# Patient Record
Sex: FEMALE | URBAN - METROPOLITAN AREA
[De-identification: names, ages, dates, MRNs, and addresses within clinical notes are randomized per-mention and may not be internally consistent; named-entity substitution may affect disease eponyms.]

---

## 2021-01-27 DIAGNOSIS — Z23 ENCOUNTER FOR IMMUNIZATION: ICD-10-CM

## 2021-02-12 ENCOUNTER — IMMUNIZATIONS (OUTPATIENT)
Dept: FAMILY MEDICINE CLINIC | Facility: HOSPITAL | Age: 87
End: 2021-02-12

## 2021-02-12 DIAGNOSIS — Z23 ENCOUNTER FOR IMMUNIZATION: Primary | ICD-10-CM

## 2021-02-12 PROCEDURE — 91300 SARS-COV-2 / COVID-19 MRNA VACCINE (PFIZER-BIONTECH) 30 MCG: CPT

## 2021-02-12 PROCEDURE — 0001A SARS-COV-2 / COVID-19 MRNA VACCINE (PFIZER-BIONTECH) 30 MCG: CPT

## 2021-03-05 ENCOUNTER — IMMUNIZATIONS (OUTPATIENT)
Dept: FAMILY MEDICINE CLINIC | Facility: HOSPITAL | Age: 87
End: 2021-03-05

## 2021-03-05 DIAGNOSIS — Z23 ENCOUNTER FOR IMMUNIZATION: Primary | ICD-10-CM

## 2021-03-05 PROCEDURE — 0002A SARS-COV-2 / COVID-19 MRNA VACCINE (PFIZER-BIONTECH) 30 MCG: CPT

## 2021-03-05 PROCEDURE — 91300 SARS-COV-2 / COVID-19 MRNA VACCINE (PFIZER-BIONTECH) 30 MCG: CPT

## 2022-12-14 ENCOUNTER — APPOINTMENT (OUTPATIENT)
Dept: PREADMISSION TESTING | Facility: HOSPITAL | Age: 87
End: 2022-12-14
Attending: ORTHOPAEDIC SURGERY
Payer: MEDICARE

## 2022-12-14 ENCOUNTER — TRANSCRIBE ORDERS (OUTPATIENT)
Dept: PREADMISSION TESTING | Facility: HOSPITAL | Age: 87
End: 2022-12-14

## 2022-12-14 ENCOUNTER — APPOINTMENT (OUTPATIENT)
Dept: LAB | Facility: HOSPITAL | Age: 87
End: 2022-12-14
Attending: ORTHOPAEDIC SURGERY
Payer: MEDICARE

## 2022-12-14 VITALS
DIASTOLIC BLOOD PRESSURE: 72 MMHG | WEIGHT: 126 LBS | SYSTOLIC BLOOD PRESSURE: 130 MMHG | RESPIRATION RATE: 16 BRPM | TEMPERATURE: 98.3 F | HEIGHT: 61 IN | OXYGEN SATURATION: 99 % | HEART RATE: 60 BPM | BODY MASS INDEX: 23.79 KG/M2

## 2022-12-14 DIAGNOSIS — E78.5 HYPERLIPIDEMIA, UNSPECIFIED HYPERLIPIDEMIA TYPE: ICD-10-CM

## 2022-12-14 DIAGNOSIS — Z01.818 ENCOUNTER FOR OTHER PREPROCEDURAL EXAMINATION: ICD-10-CM

## 2022-12-14 DIAGNOSIS — M17.11 UNILATERAL PRIMARY OSTEOARTHRITIS, RIGHT KNEE: ICD-10-CM

## 2022-12-14 DIAGNOSIS — M17.11 UNILATERAL PRIMARY OSTEOARTHRITIS, RIGHT KNEE: Primary | ICD-10-CM

## 2022-12-14 DIAGNOSIS — K21.9 GASTROESOPHAGEAL REFLUX DISEASE, UNSPECIFIED WHETHER ESOPHAGITIS PRESENT: ICD-10-CM

## 2022-12-14 DIAGNOSIS — Z01.818 ENCOUNTER FOR PRE-OPERATIVE EXAMINATION: Primary | ICD-10-CM

## 2022-12-14 DIAGNOSIS — I10 PRIMARY HYPERTENSION: ICD-10-CM

## 2022-12-14 DIAGNOSIS — R94.31 ABNORMAL EKG: ICD-10-CM

## 2022-12-14 LAB
ABO + RH BLD: NORMAL
ALBUMIN SERPL-MCNC: 4.1 G/DL (ref 3.4–5)
ALP SERPL-CCNC: 79 IU/L (ref 35–126)
ALT SERPL-CCNC: 15 IU/L (ref 11–54)
ANION GAP SERPL CALC-SCNC: 6 MEQ/L (ref 3–15)
AST SERPL-CCNC: 21 IU/L (ref 15–41)
BASOPHILS # BLD: 0.05 K/UL (ref 0.01–0.1)
BASOPHILS NFR BLD: 1.2 %
BILIRUB SERPL-MCNC: 1 MG/DL (ref 0.3–1.2)
BLD GP AB SCN SERPL QL: NEGATIVE
BLOOD BANK CMNT PATIENT-IMP: NORMAL
BUN SERPL-MCNC: 20 MG/DL (ref 8–20)
CALCIUM SERPL-MCNC: 9.6 MG/DL (ref 8.9–10.3)
CHLORIDE SERPL-SCNC: 102 MEQ/L (ref 98–109)
CO2 SERPL-SCNC: 27 MEQ/L (ref 22–32)
CREAT SERPL-MCNC: 0.8 MG/DL (ref 0.6–1.1)
D AG BLD QL: POSITIVE
DIFFERENTIAL METHOD BLD: ABNORMAL
EOSINOPHIL # BLD: 0.31 K/UL (ref 0.04–0.36)
EOSINOPHIL NFR BLD: 7.3 %
ERYTHROCYTE [DISTWIDTH] IN BLOOD BY AUTOMATED COUNT: 13.6 % (ref 11.7–14.4)
GFR SERPL CREATININE-BSD FRML MDRD: >60 ML/MIN/1.73M*2
GLUCOSE SERPL-MCNC: 91 MG/DL (ref 70–99)
HCT VFR BLDCO AUTO: 37.6 % (ref 35–45)
HGB BLD-MCNC: 12.3 G/DL (ref 11.8–15.7)
IMM GRANULOCYTES # BLD AUTO: 0.01 K/UL (ref 0–0.08)
IMM GRANULOCYTES NFR BLD AUTO: 0.2 %
LABORATORY COMMENT REPORT: NORMAL
LYMPHOCYTES # BLD: 1.69 K/UL (ref 1.2–3.5)
LYMPHOCYTES NFR BLD: 39.8 %
MCH RBC QN AUTO: 31.5 PG (ref 28–33.2)
MCHC RBC AUTO-ENTMCNC: 32.7 G/DL (ref 32.2–35.5)
MCV RBC AUTO: 96.2 FL (ref 83–98)
MONOCYTES # BLD: 0.45 K/UL (ref 0.28–0.8)
MONOCYTES NFR BLD: 10.6 %
NEUTROPHILS # BLD: 1.74 K/UL (ref 1.7–7)
NEUTS SEG NFR BLD: 40.9 %
NRBC BLD-RTO: 0 %
PDW BLD AUTO: 9.6 FL (ref 9.4–12.3)
PLATELET # BLD AUTO: 375 K/UL (ref 150–369)
POTASSIUM SERPL-SCNC: 4.2 MEQ/L (ref 3.6–5.1)
PROT SERPL-MCNC: 6.9 G/DL (ref 6–8.2)
RBC # BLD AUTO: 3.91 M/UL (ref 3.93–5.22)
SODIUM SERPL-SCNC: 135 MEQ/L (ref 136–144)
SPECIMEN EXP DATE BLD: NORMAL
WBC # BLD AUTO: 4.25 K/UL (ref 3.8–10.5)

## 2022-12-14 PROCEDURE — 80053 COMPREHEN METABOLIC PANEL: CPT

## 2022-12-14 PROCEDURE — 86850 RBC ANTIBODY SCREEN: CPT

## 2022-12-14 PROCEDURE — 99203 OFFICE O/P NEW LOW 30 MIN: CPT | Performed by: HOSPITALIST

## 2022-12-14 PROCEDURE — 85025 COMPLETE CBC W/AUTO DIFF WBC: CPT

## 2022-12-14 PROCEDURE — 36415 COLL VENOUS BLD VENIPUNCTURE: CPT

## 2022-12-14 RX ORDER — AMLODIPINE BESYLATE 5 MG/1
5 TABLET ORAL DAILY
COMMUNITY

## 2022-12-14 RX ORDER — LANSOPRAZOLE 30 MG/1
30 CAPSULE, DELAYED RELEASE ORAL
COMMUNITY

## 2022-12-14 RX ORDER — CHOLECALCIFEROL (VITAMIN D3) 25 MCG
1000 TABLET ORAL DAILY
COMMUNITY

## 2022-12-14 RX ORDER — UBIDECARENONE 100 MG
100 CAPSULE ORAL DAILY
Status: ON HOLD | COMMUNITY
End: 2022-12-28 | Stop reason: SDUPTHER

## 2022-12-14 RX ORDER — PRAVASTATIN SODIUM 20 MG/1
20 TABLET ORAL DAILY
COMMUNITY

## 2022-12-14 RX ORDER — HYDROCHLOROTHIAZIDE 12.5 MG/1
12.5 CAPSULE ORAL DAILY
COMMUNITY

## 2022-12-14 RX ORDER — IBUPROFEN 600 MG/1
600 TABLET ORAL EVERY 6 HOURS PRN
COMMUNITY

## 2022-12-14 ASSESSMENT — PAIN SCALES - GENERAL: PAINLEVEL: 0-NO PAIN

## 2022-12-14 NOTE — H&P (VIEW-ONLY)
[facility] Medicine Service -  Pre-Operative Consultation       Patient Name: Felicia Puckett  Referring Surgeon: Dr Vee    Reason for Referral: Pre-Operative Evaluation  Surgical Procedure: Right Knee Arthroplasty  Operative Date: 12/27/22  Other Providers:      PCP: Leela Dewey MD          HISTORY OF PRESENT ILLNESS      Felicia Puckett is a 89 y.o. female presenting today to the [facility] at [place] for pre-operative evaluation prior to planned surgery.    Complaining of right knee pain for the last 20 years  and worse for the last one year  She tried all nonoperative measures without much improvement  She denies any exertional chest pain or sob and her exercise tolerance is > 4 mets. She is cleared by her cardiologist Myriam Ludwig, for the upcoming procedure  She denies any history of cad/mi/stroke      In regards to medical history:  Hypertension  Hyperlipidemia  Gerd    The patient denies any current or recent chest pain or pressure, dyspnea, cough, sputum, fevers, chills, abdominal pain, nausea, vomiting, diarrhea or other symptoms.     Functionally, the patient is able to ascend a flight or so of stairs with no dyspnea or chest pain.     The patient denies, on specific questioning, the following:  No history of MI, arrhythmia,or CHF.  No history of ALEA.  No history of DVT/PE.  No history of COPD.  No history of CVA.  No history of DM.   No history of CKD.     PAST MEDICAL AND SURGICAL HISTORY      Past Medical History:   Diagnosis Date   • COVID 11/18/2022    not hospitalized, dirrhea for 1 week and fever.   • COVID-19 vaccine series completed    • GERD (gastroesophageal reflux disease)    • Hypertension    • Lipid disorder        Past Surgical History:   Procedure Laterality Date   • BLADDER SURGERY     • FINGER SURGERY     • HYSTERECTOMY         MEDICATIONS        Current Outpatient Medications:   •  amLODIPine (NORVASC) 5 mg  "tablet, Take 5 mg by mouth daily. am, Disp: , Rfl:   •  cholecalciferol, vitamin D3, 1,000 unit (25 mcg) tablet, Take 1,000 Units by mouth daily., Disp: , Rfl:   •  coenzyme Q10 (COQ10) 100 mg capsule, Take 100 mg by mouth daily., Disp: , Rfl:   •  esterified estrogens (MENEST) 0.3 mg tablet, Take 0.3 mg by mouth daily., Disp: , Rfl:   •  hydrochlorothiazide (MICROZIDE) 12.5 mg capsule, Take 12.5 mg by mouth daily. am, Disp: , Rfl:   •  ibuprofen (MOTRIN) 600 mg tablet, Take 600 mg by mouth every 6 (six) hours as needed. Pt usually takes 1 time a day., Disp: , Rfl:   •  lansoprazole (PREVACID) 30 mg capsule, Take 30 mg by mouth daily before breakfast. am, Disp: , Rfl:   •  pravastatin (PRAVACHOL) 20 mg tablet, Take 20 mg by mouth daily. pm, Disp: , Rfl:     ALLERGIES      Penicillins    FAMILY HISTORY      family history includes Hypertension in her biological mother.    Denies any prior known family history of DVTs/PEs/clotting disorder    SOCIAL HISTORY      Social History     Tobacco Use   • Smoking status: Never   • Smokeless tobacco: Never   Vaping Use   • Vaping Use: Never used   Substance Use Topics   • Alcohol use: Not Currently   • Drug use: Never       REVIEW OF SYSTEMS      All other systems reviewed and negative except as noted in HPI    PHYSICAL EXAMINATION      Visit Vitals  /72 (BP Location: Left upper arm, Patient Position: Sitting)   Pulse 60   Temp 36.8 °C (98.3 °F)   Resp 16   Ht 1.549 m (5' 1\")   Wt 57.2 kg (126 lb)   SpO2 99%   BMI 23.81 kg/m²     Body mass index is 23.81 kg/m².    Physical Exam  General appearance: alert, appears stated age, cooperative, non-toxic  Head: normocephalic, without obvious abnormality, atraumatic  Eyes: conjunctivae clear. PERRL, EOMI's intact.  Lungs: clear to auscultation bilaterally   Heart: regular rate and rhythm, S1, S2 normal,   Abdomen: Nondistended, +BS, soft, non-tender, no masses palpable   Extremities: Right knee range of movements limited " secondary to pain  Pulses: 2+ and symmetric B/L DP  Neurologic: Alert and oriented X 3, no focal deficits  Skin: intact, no rashes or lesions      LABS / EKG        Labs      Lab Results   Component Value Date     (L) 12/14/2022    K 4.2 12/14/2022     12/14/2022    BUN 20 12/14/2022    CREATININE 0.8 12/14/2022    WBC 4.25 12/14/2022    HGB 12.3 12/14/2022    HCT 37.6 12/14/2022     (H) 12/14/2022    ALT 15 12/14/2022    AST 21 12/14/2022         ECG/Telemetry  I have independently reviewed the ECG. Significant findings include EKG shows normal sinus rhythm with poor R wave progression with septal Q waves.    ASSESSMENT AND PLAN         Encounter for pre-operative examination  Scheduled to have Right Total Knee Arthroplasty by Dr Vee on 12/27/22  Complaining of right knee pain for the last 20 years  and worse for the last one year  She tried all nonoperative measures without much improvement  She denies any exertional chest pain or sob and her exercise tolerance is > 4 mets. She is cleared by her cardiologist Dr Salinas, Myriam Roth, for the upcoming procedure  She denies any history of cad/mi/stroke  She denies any bowel or bladder disturbance  She denies any history of dvt/pe  She snores at night time but denies any apneic episodes  EKG shows normal sinus rhythm with poor R wave progression with questionable septal Q waves    Abnormal EKG  EKG shows normal sinus rhythm with poor R wave progression with septal Q waves  Patient was evaluated by cardiology and had a negative stress test and normal echo and cleared for surgery    Primary hypertension  Continue amlodipine but hold HCTZ on the a.m. of surgery    Hyperlipidemia  Continue pravastatin    GERD (gastroesophageal reflux disease)  Continue PPI       In regards to perioperative cardiac risk:  Regarding perioperative cardiovascular risk:  The patient has the following risk factors: none.  This correlates to a rCRI score of 0  with perioperative cardiac event risk of 0.4%.  Knee arthroplasty is an intermediate risk procedure and she is at acceptable risk for surgery      Further comments:  Resume supplements when OK with surgical team.  I would encourage incentive spirometry to assist with minimizing silvia-operative pulmonary risk.  DVT prophylaxis and timing of such per the discretion of the surgeon.     Please do not hesitate to contact [facility] during the upcoming hospitalization with any questions or concerns.     Timi Perkins MD  12/14/2022

## 2022-12-14 NOTE — ASSESSMENT & PLAN NOTE
EKG shows normal sinus rhythm with poor R wave progression with septal Q waves  Patient was evaluated by cardiology and had a negative stress test and normal echo and cleared for surgery

## 2022-12-14 NOTE — PRE-PROCEDURE INSTRUCTIONS
1. Admissions will call you with your arrival time on  1/2/23/22 (day prior to surgery) between 2pm-4pm.  For questions about your arrival time, please call [phone].     2. On the day of your procedure please report to the [facility] in the [place]. Please arrive through the [place] Lobby Entrance.  If you are parking in the [facility], come to the ground floor of the garage and follow signs to the [facility].  After being screened, please report to either the Outpatient Registration for Pre-Admission Testing or to the Surgery Registration Desk.    3. Please follow the following fasting guidelines:     No solid food EIGHT HOURS prior to surgery.  Unlimited clear liquids, meaning water or PLAIN black coffee WITHOUT any milk, cream, sugar, or sweetener are permitted up to TWO HOURS prior to arrival at the hospital.    4. Early on the morning of the procedure please take your usual dose of the listed medications with a sip of water:Amlodipine and Prevacid    5. Other Instructions: You may brush your teeth the morning of the procedure. Rinse and spit, do not swallow.  Bring a list of your medications with dosages.  Use surgical wash as directed.     6. If you develop a cold, cough, fever, rash, or other symptom prior to the day of the procedure, please report it to your physician immediately.    7. If you need to cancel the procedure for any reason, please contact your physician.    8. Make arrangements to have someone drive you home from the procedure. If you have not arranged for transportation home, your surgery may be cancelled.     9. You may not take public transportation unless accompanied by a responsible person.    10. You may not drive a car or operate complex or potentially dangerous machinery for 24 hours following anesthesia and/or sedation.    11.  If it is medically necessary for you to have a longer stay, you will be informed as soon as the decision is  made.    12. Only bring essential items to the hospital.  Do not wear or bring anything of value to the hospital including jewelry of any kind, money, or wallet. Do not wear make-up or contact lenses.  DO NOT BRING MEDICATIONS FROM HOME unless instructed to do so. DO bring your hearing aids, glasses, and a case    13. No lotion, creams, powders, or oils on skin the morning of procedure     14. Dress in comfortable clothes.    15.  If instructed, please bring a copy of your Advanced Directive (Living Will/Durable Power of ) on the day of your procedure.     16. Patients need to quarantine from the time of PAT COVID test to day of surgery, regardless of COVID vaccine status.      17. Ensuring your safety at all times is a very important part of our [facility] Culture of Safety. After having surgery and sedation, you are at risk for falling and balance issues. Although you may feel awake, the effects of the medication can last up to 24 hours after anesthesia. If you need to use the bathroom during your recovery period, nursing staff will escort you there and stay with you to ensure your safety.    18. Refrain from drinking alcohol and smoking cigarettes for 24 hours prior to surgery.    19. Shower with antibacterial soap (Dial) the night before and morning of your procedure.  If your procedure indicates the need for CHG antiseptic wash (Bactoshield or Hibiclens), please use this instead and follow instructions as discussed at the time of your Pre-Admission Testing phone interview or visit.    Above instructions reviewed with patient and patient acknowledges understanding.    Form explained by: Gabriella Gloria RN

## 2022-12-14 NOTE — CONSULTS
[facility] Medicine Service -  Pre-Operative Consultation       Patient Name: Felicia Puckett  Referring Surgeon: Dr Vee    Reason for Referral: Pre-Operative Evaluation  Surgical Procedure: Right Knee Arthroplasty  Operative Date: 12/27/22  Other Providers:      PCP: Leela Dewey MD          HISTORY OF PRESENT ILLNESS      Felicia Puckett is a 89 y.o. female presenting today to the [facility] at [place] for pre-operative evaluation prior to planned surgery.    Complaining of right knee pain for the last 20 years  and worse for the last one year  She tried all nonoperative measures without much improvement  She denies any exertional chest pain or sob and her exercise tolerance is > 4 mets. She is cleared by her cardiologist Myriam Ludwig, for the upcoming procedure  She denies any history of cad/mi/stroke      In regards to medical history:  Hypertension  Hyperlipidemia  Gerd    The patient denies any current or recent chest pain or pressure, dyspnea, cough, sputum, fevers, chills, abdominal pain, nausea, vomiting, diarrhea or other symptoms.     Functionally, the patient is able to ascend a flight or so of stairs with no dyspnea or chest pain.     The patient denies, on specific questioning, the following:  No history of MI, arrhythmia,or CHF.  No history of ALEA.  No history of DVT/PE.  No history of COPD.  No history of CVA.  No history of DM.   No history of CKD.     PAST MEDICAL AND SURGICAL HISTORY      Past Medical History:   Diagnosis Date   • COVID 11/18/2022    not hospitalized, dirrhea for 1 week and fever.   • COVID-19 vaccine series completed    • GERD (gastroesophageal reflux disease)    • Hypertension    • Lipid disorder        Past Surgical History:   Procedure Laterality Date   • BLADDER SURGERY     • FINGER SURGERY     • HYSTERECTOMY         MEDICATIONS        Current Outpatient Medications:   •  amLODIPine (NORVASC) 5 mg  "tablet, Take 5 mg by mouth daily. am, Disp: , Rfl:   •  cholecalciferol, vitamin D3, 1,000 unit (25 mcg) tablet, Take 1,000 Units by mouth daily., Disp: , Rfl:   •  coenzyme Q10 (COQ10) 100 mg capsule, Take 100 mg by mouth daily., Disp: , Rfl:   •  esterified estrogens (MENEST) 0.3 mg tablet, Take 0.3 mg by mouth daily., Disp: , Rfl:   •  hydrochlorothiazide (MICROZIDE) 12.5 mg capsule, Take 12.5 mg by mouth daily. am, Disp: , Rfl:   •  ibuprofen (MOTRIN) 600 mg tablet, Take 600 mg by mouth every 6 (six) hours as needed. Pt usually takes 1 time a day., Disp: , Rfl:   •  lansoprazole (PREVACID) 30 mg capsule, Take 30 mg by mouth daily before breakfast. am, Disp: , Rfl:   •  pravastatin (PRAVACHOL) 20 mg tablet, Take 20 mg by mouth daily. pm, Disp: , Rfl:     ALLERGIES      Penicillins    FAMILY HISTORY      family history includes Hypertension in her biological mother.    Denies any prior known family history of DVTs/PEs/clotting disorder    SOCIAL HISTORY      Social History     Tobacco Use   • Smoking status: Never   • Smokeless tobacco: Never   Vaping Use   • Vaping Use: Never used   Substance Use Topics   • Alcohol use: Not Currently   • Drug use: Never       REVIEW OF SYSTEMS      All other systems reviewed and negative except as noted in HPI    PHYSICAL EXAMINATION      Visit Vitals  /72 (BP Location: Left upper arm, Patient Position: Sitting)   Pulse 60   Temp 36.8 °C (98.3 °F)   Resp 16   Ht 1.549 m (5' 1\")   Wt 57.2 kg (126 lb)   SpO2 99%   BMI 23.81 kg/m²     Body mass index is 23.81 kg/m².    Physical Exam  General appearance: alert, appears stated age, cooperative, non-toxic  Head: normocephalic, without obvious abnormality, atraumatic  Eyes: conjunctivae clear. PERRL, EOMI's intact.  Lungs: clear to auscultation bilaterally   Heart: regular rate and rhythm, S1, S2 normal,   Abdomen: Nondistended, +BS, soft, non-tender, no masses palpable   Extremities: Right knee range of movements limited " secondary to pain  Pulses: 2+ and symmetric B/L DP  Neurologic: Alert and oriented X 3, no focal deficits  Skin: intact, no rashes or lesions      LABS / EKG        Labs      Lab Results   Component Value Date     (L) 12/14/2022    K 4.2 12/14/2022     12/14/2022    BUN 20 12/14/2022    CREATININE 0.8 12/14/2022    WBC 4.25 12/14/2022    HGB 12.3 12/14/2022    HCT 37.6 12/14/2022     (H) 12/14/2022    ALT 15 12/14/2022    AST 21 12/14/2022         ECG/Telemetry  I have independently reviewed the ECG. Significant findings include EKG shows normal sinus rhythm with poor R wave progression with septal Q waves.    ASSESSMENT AND PLAN         Encounter for pre-operative examination  Scheduled to have Right Total Knee Arthroplasty by Dr Vee on 12/27/22  Complaining of right knee pain for the last 20 years  and worse for the last one year  She tried all nonoperative measures without much improvement  She denies any exertional chest pain or sob and her exercise tolerance is > 4 mets. She is cleared by her cardiologist Dr Salinas, Myriam Roth, for the upcoming procedure  She denies any history of cad/mi/stroke  She denies any bowel or bladder disturbance  She denies any history of dvt/pe  She snores at night time but denies any apneic episodes  EKG shows normal sinus rhythm with poor R wave progression with questionable septal Q waves    Abnormal EKG  EKG shows normal sinus rhythm with poor R wave progression with septal Q waves  Patient was evaluated by cardiology and had a negative stress test and normal echo and cleared for surgery    Primary hypertension  Continue amlodipine but hold HCTZ on the a.m. of surgery    Hyperlipidemia  Continue pravastatin    GERD (gastroesophageal reflux disease)  Continue PPI       In regards to perioperative cardiac risk:  Regarding perioperative cardiovascular risk:  The patient has the following risk factors: none.  This correlates to a rCRI score of 0  with perioperative cardiac event risk of 0.4%.  Knee arthroplasty is an intermediate risk procedure and she is at acceptable risk for surgery      Further comments:  Resume supplements when OK with surgical team.  I would encourage incentive spirometry to assist with minimizing silvia-operative pulmonary risk.  DVT prophylaxis and timing of such per the discretion of the surgeon.     Please do not hesitate to contact [facility] during the upcoming hospitalization with any questions or concerns.     Timi Perkins MD  12/14/2022

## 2022-12-14 NOTE — ASSESSMENT & PLAN NOTE
Scheduled to have Right Total Knee Arthroplasty by Dr Vee on 12/27/22  Complaining of right knee pain for the last 20 years  and worse for the last one year  She tried all nonoperative measures without much improvement  She denies any exertional chest pain or sob and her exercise tolerance is > 4 mets. She is cleared by her cardiologist Dr Salinas, Myriam Roth, for the upcoming procedure  She denies any history of cad/mi/stroke  She denies any bowel or bladder disturbance  She denies any history of dvt/pe  She snores at night time but denies any apneic episodes  EKG shows normal sinus rhythm with poor R wave progression with questionable septal Q waves

## 2022-12-22 ENCOUNTER — APPOINTMENT (OUTPATIENT)
Dept: LAB | Facility: HOSPITAL | Age: 87
End: 2022-12-22
Attending: ORTHOPAEDIC SURGERY
Payer: MEDICARE

## 2022-12-22 LAB — SARS-COV-2 RNA RESP QL NAA+PROBE: NEGATIVE

## 2022-12-22 PROCEDURE — U0003 INFECTIOUS AGENT DETECTION BY NUCLEIC ACID (DNA OR RNA); SEVERE ACUTE RESPIRATORY SYNDROME CORONAVIRUS 2 (SARS-COV-2) (CORONAVIRUS DISEASE [COVID-19]), AMPLIFIED PROBE TECHNIQUE, MAKING USE OF HIGH THROUGHPUT TECHNOLOGIES AS DESCRIBED BY CMS-2020-01-R: HCPCS

## 2022-12-22 PROCEDURE — C9803 HOPD COVID-19 SPEC COLLECT: HCPCS

## 2022-12-23 NOTE — DISCHARGE INSTRUCTIONS
Please keep knee flexed whenever resting. Wear AFO brace while ambulating/ walking. Work on straightening/knee extension exercises while standing.    Follow up with Dr. Vee in one week.    DVT Prophylaxis:   -Continue with Aspirin 81 mg by mouth twice daily X 4 weeks for blood clot prevention.    Incision Care:  -Please remove your surgical dressing on 1/1/23. At that time if the wound is dry and not draining, you can leave it uncovered, open to air. If there is drainage, please place 4x4s covered by paper tape over your incision.  -Please do not submerge incision in water, no baths, no swimming, no pools, no hot tubs, etc.   -Please keep incision clean and dry. Once your dressing has been removed, do not scrub the incision in the shower and pat dry with a clean towel not used to dry your body.   -Please make sure your incision(s) are checked for signs and symptoms of infection: If any of the below should occur, please call the office:   -drainage from incision site, opening of incision, increased redness and/or tenderness, fevers greater than 101, flu-like symptoms.   -Please call office or go to ED with any thigh/calf pain or swelling in legs, chest pain, chest congestion, problems with breathing.     Constipation/Pain Medication:   -Take your pain medication with food. Do not drive while taking pain medication.   -Pain medications may cause constipation.   -If you have not had a bowel movement in 3 days please call the office   - Please take Senna-Colace as prescribed. Hold for loose stool. If you are having difficulties having a bowel   movement or haven't had bowel movement in 2 days, please add over the counter miralax and take as directed on package.   -Drink plenty of fluids and eat fresh fruits and vegetables.   -DO NOT SMOKE! Smoking is not healthy for your healing process.

## 2022-12-27 ENCOUNTER — APPOINTMENT (OUTPATIENT)
Dept: RADIOLOGY | Facility: HOSPITAL | Age: 87
DRG: 470 | End: 2022-12-27
Attending: NURSE PRACTITIONER
Payer: MEDICARE

## 2022-12-27 ENCOUNTER — ANESTHESIA EVENT (OUTPATIENT)
Dept: OPERATING ROOM | Facility: HOSPITAL | Age: 87
Setting detail: HOSPITAL OUTPATIENT SURGERY
DRG: 470 | End: 2022-12-27
Payer: MEDICARE

## 2022-12-27 ENCOUNTER — HOSPITAL ENCOUNTER (INPATIENT)
Facility: HOSPITAL | Age: 87
LOS: 1 days | Discharge: HOME | DRG: 470 | End: 2022-12-29
Attending: ORTHOPAEDIC SURGERY | Admitting: ORTHOPAEDIC SURGERY
Payer: MEDICARE

## 2022-12-27 DIAGNOSIS — Z98.890 S/P KNEE SURGERY: Primary | ICD-10-CM

## 2022-12-27 LAB
ABO + RH BLD: NORMAL
D AG BLD QL: POSITIVE
LABORATORY COMMENT REPORT: NORMAL

## 2022-12-27 PROCEDURE — 25800000 HC PHARMACY IV SOLUTIONS: Performed by: NURSE PRACTITIONER

## 2022-12-27 PROCEDURE — 63700000 HC SELF-ADMINISTRABLE DRUG: Performed by: ORTHOPAEDIC SURGERY

## 2022-12-27 PROCEDURE — 63600000 HC DRUGS/DETAIL CODE: Performed by: NURSE PRACTITIONER

## 2022-12-27 PROCEDURE — 36415 COLL VENOUS BLD VENIPUNCTURE: CPT | Performed by: ORTHOPAEDIC SURGERY

## 2022-12-27 PROCEDURE — 25000000 HC PHARMACY GENERAL: Performed by: ORTHOPAEDIC SURGERY

## 2022-12-27 PROCEDURE — 99225 PR SBSQ OBSERVATION CARE/DAY 25 MINUTES: CPT | Performed by: HOSPITALIST

## 2022-12-27 PROCEDURE — C1713 ANCHOR/SCREW BN/BN,TIS/BN: HCPCS | Performed by: ORTHOPAEDIC SURGERY

## 2022-12-27 PROCEDURE — 63600000 HC DRUGS/DETAIL CODE: Performed by: ORTHOPAEDIC SURGERY

## 2022-12-27 PROCEDURE — 71000011 HC PACU PHASE 1 EA ADDL MIN: Performed by: ORTHOPAEDIC SURGERY

## 2022-12-27 PROCEDURE — C1776 JOINT DEVICE (IMPLANTABLE): HCPCS | Performed by: ORTHOPAEDIC SURGERY

## 2022-12-27 PROCEDURE — 97166 OT EVAL MOD COMPLEX 45 MIN: CPT | Mod: GO

## 2022-12-27 PROCEDURE — 36000006 HC OR LEVEL 6 INITIAL 30MIN: Performed by: ORTHOPAEDIC SURGERY

## 2022-12-27 PROCEDURE — 25800000 HC PHARMACY IV SOLUTIONS: Performed by: ORTHOPAEDIC SURGERY

## 2022-12-27 PROCEDURE — 27200000 HC STERILE SUPPLY: Performed by: ORTHOPAEDIC SURGERY

## 2022-12-27 PROCEDURE — 71000001 HC PACU PHASE 1 INITIAL 30MIN: Performed by: ORTHOPAEDIC SURGERY

## 2022-12-27 PROCEDURE — 63600000 HC DRUGS/DETAIL CODE: Performed by: ANESTHESIOLOGY

## 2022-12-27 PROCEDURE — 36000016 HC OR LEVEL 6 EA ADDL MIN: Performed by: ORTHOPAEDIC SURGERY

## 2022-12-27 PROCEDURE — 25000000 HC PHARMACY GENERAL: Performed by: ANESTHESIOLOGY

## 2022-12-27 PROCEDURE — 63700000 HC SELF-ADMINISTRABLE DRUG

## 2022-12-27 PROCEDURE — 97162 PT EVAL MOD COMPLEX 30 MIN: CPT | Mod: GP

## 2022-12-27 PROCEDURE — 63700000 HC SELF-ADMINISTRABLE DRUG: Performed by: NURSE PRACTITIONER

## 2022-12-27 PROCEDURE — 97530 THERAPEUTIC ACTIVITIES: CPT | Mod: GO

## 2022-12-27 PROCEDURE — 97530 THERAPEUTIC ACTIVITIES: CPT | Mod: GP

## 2022-12-27 PROCEDURE — 37000002 HC ANESTHESIA MAC: Performed by: ORTHOPAEDIC SURGERY

## 2022-12-27 PROCEDURE — 0SRC0J9 REPLACEMENT OF RIGHT KNEE JOINT WITH SYNTHETIC SUBSTITUTE, CEMENTED, OPEN APPROACH: ICD-10-PCS | Performed by: ORTHOPAEDIC SURGERY

## 2022-12-27 PROCEDURE — 73560 X-RAY EXAM OF KNEE 1 OR 2: CPT | Mod: RT

## 2022-12-27 DEVICE — CEMENT BONE SIMPLEX FULL DOSE: Type: IMPLANTABLE DEVICE | Site: KNEE | Status: FUNCTIONAL

## 2022-12-27 DEVICE — TIBIAL BEARING INSERT - CS
Type: IMPLANTABLE DEVICE | Site: KNEE | Status: FUNCTIONAL
Brand: TRIATHLON

## 2022-12-27 DEVICE — PRIMARY TIBIAL BASEPLATE
Type: IMPLANTABLE DEVICE | Site: KNEE | Status: FUNCTIONAL
Brand: TRIATHLON

## 2022-12-27 DEVICE — ASYMMETRIC PATELLA
Type: IMPLANTABLE DEVICE | Site: PATELLA | Status: FUNCTIONAL
Brand: TRIATHLON

## 2022-12-27 DEVICE — CRUCIATE RETAINING FEMORAL
Type: IMPLANTABLE DEVICE | Site: KNEE | Status: FUNCTIONAL
Brand: TRIATHLON

## 2022-12-27 RX ORDER — CELECOXIB 200 MG/1
400 CAPSULE ORAL
Status: COMPLETED | OUTPATIENT
Start: 2022-12-27 | End: 2022-12-27

## 2022-12-27 RX ORDER — DEXAMETHASONE SODIUM PHOSPHATE 4 MG/ML
8 INJECTION, SOLUTION INTRA-ARTICULAR; INTRALESIONAL; INTRAMUSCULAR; INTRAVENOUS; SOFT TISSUE ONCE
Status: COMPLETED | OUTPATIENT
Start: 2022-12-28 | End: 2022-12-28

## 2022-12-27 RX ORDER — HYDROMORPHONE HYDROCHLORIDE 1 MG/ML
0.5 INJECTION, SOLUTION INTRAMUSCULAR; INTRAVENOUS; SUBCUTANEOUS
Status: DISCONTINUED | OUTPATIENT
Start: 2022-12-27 | End: 2022-12-27 | Stop reason: HOSPADM

## 2022-12-27 RX ORDER — DIPHENHYDRAMINE HCL 50 MG/ML
12.5 VIAL (ML) INJECTION
Status: DISCONTINUED | OUTPATIENT
Start: 2022-12-27 | End: 2022-12-27 | Stop reason: HOSPADM

## 2022-12-27 RX ORDER — SODIUM CHLORIDE 9 MG/ML
INJECTION INTRAMUSCULAR; INTRAVENOUS; SUBCUTANEOUS
Status: DISCONTINUED | OUTPATIENT
Start: 2022-12-27 | End: 2022-12-27 | Stop reason: HOSPADM

## 2022-12-27 RX ORDER — ACETAMINOPHEN 325 MG/1
650 TABLET ORAL
Status: COMPLETED | OUTPATIENT
Start: 2022-12-27 | End: 2022-12-29

## 2022-12-27 RX ORDER — LIDOCAINE HYDROCHLORIDE 10 MG/ML
INJECTION, SOLUTION INFILTRATION; PERINEURAL AS NEEDED
Status: DISCONTINUED | OUTPATIENT
Start: 2022-12-27 | End: 2022-12-27 | Stop reason: SURG

## 2022-12-27 RX ORDER — PROPOFOL 10 MG/ML
INJECTION, EMULSION INTRAVENOUS AS NEEDED
Status: DISCONTINUED | OUTPATIENT
Start: 2022-12-27 | End: 2022-12-27 | Stop reason: SURG

## 2022-12-27 RX ORDER — IBUPROFEN 200 MG
16-32 TABLET ORAL AS NEEDED
Status: DISCONTINUED | OUTPATIENT
Start: 2022-12-27 | End: 2022-12-29 | Stop reason: HOSPADM

## 2022-12-27 RX ORDER — DEXTROSE 40 %
15-30 GEL (GRAM) ORAL AS NEEDED
Status: DISCONTINUED | OUTPATIENT
Start: 2022-12-27 | End: 2022-12-29 | Stop reason: HOSPADM

## 2022-12-27 RX ORDER — MEPERIDINE HYDROCHLORIDE 25 MG/ML
12.5 INJECTION INTRAMUSCULAR; INTRAVENOUS; SUBCUTANEOUS EVERY 10 MIN PRN
Status: DISCONTINUED | OUTPATIENT
Start: 2022-12-27 | End: 2022-12-27 | Stop reason: HOSPADM

## 2022-12-27 RX ORDER — CELECOXIB 200 MG/1
CAPSULE ORAL
Status: COMPLETED
Start: 2022-12-27 | End: 2022-12-27

## 2022-12-27 RX ORDER — ACETAMINOPHEN 325 MG/1
TABLET ORAL
Status: COMPLETED
Start: 2022-12-27 | End: 2022-12-27

## 2022-12-27 RX ORDER — SODIUM CHLORIDE 9 MG/ML
INJECTION, SOLUTION INTRAVENOUS CONTINUOUS
Status: ACTIVE | OUTPATIENT
Start: 2022-12-27 | End: 2022-12-28

## 2022-12-27 RX ORDER — BISACODYL 10 MG/1
10 SUPPOSITORY RECTAL DAILY PRN
Status: DISCONTINUED | OUTPATIENT
Start: 2022-12-27 | End: 2022-12-29 | Stop reason: HOSPADM

## 2022-12-27 RX ORDER — FENTANYL CITRATE 50 UG/ML
50 INJECTION, SOLUTION INTRAMUSCULAR; INTRAVENOUS EVERY 5 MIN PRN
Status: COMPLETED | OUTPATIENT
Start: 2022-12-27 | End: 2022-12-27

## 2022-12-27 RX ORDER — ONDANSETRON 4 MG/1
4 TABLET, ORALLY DISINTEGRATING ORAL EVERY 8 HOURS PRN
Status: DISCONTINUED | OUTPATIENT
Start: 2022-12-27 | End: 2022-12-29 | Stop reason: HOSPADM

## 2022-12-27 RX ORDER — CEFAZOLIN SODIUM 2 G/100ML
INJECTION, SOLUTION INTRAVENOUS AS NEEDED
Status: DISCONTINUED | OUTPATIENT
Start: 2022-12-27 | End: 2022-12-27 | Stop reason: SURG

## 2022-12-27 RX ORDER — HYDROMORPHONE HYDROCHLORIDE 1 MG/ML
INJECTION, SOLUTION INTRAMUSCULAR; INTRAVENOUS; SUBCUTANEOUS AS NEEDED
Status: DISCONTINUED | OUTPATIENT
Start: 2022-12-27 | End: 2022-12-27 | Stop reason: SURG

## 2022-12-27 RX ORDER — POLYETHYLENE GLYCOL 3350 17 G/17G
17 POWDER, FOR SOLUTION ORAL DAILY
Status: DISCONTINUED | OUTPATIENT
Start: 2022-12-27 | End: 2022-12-29 | Stop reason: HOSPADM

## 2022-12-27 RX ORDER — KETOROLAC TROMETHAMINE 15 MG/ML
15 INJECTION, SOLUTION INTRAMUSCULAR; INTRAVENOUS
Status: COMPLETED | OUTPATIENT
Start: 2022-12-27 | End: 2022-12-29

## 2022-12-27 RX ORDER — ALUMINUM HYDROXIDE, MAGNESIUM HYDROXIDE, AND SIMETHICONE 1200; 120; 1200 MG/30ML; MG/30ML; MG/30ML
30 SUSPENSION ORAL EVERY 4 HOURS PRN
Status: DISCONTINUED | OUTPATIENT
Start: 2022-12-27 | End: 2022-12-29 | Stop reason: HOSPADM

## 2022-12-27 RX ORDER — AMOXICILLIN 250 MG
1 CAPSULE ORAL 2 TIMES DAILY
Status: DISCONTINUED | OUTPATIENT
Start: 2022-12-27 | End: 2022-12-29 | Stop reason: HOSPADM

## 2022-12-27 RX ORDER — ONDANSETRON HYDROCHLORIDE 2 MG/ML
4 INJECTION, SOLUTION INTRAVENOUS
Status: DISCONTINUED | OUTPATIENT
Start: 2022-12-27 | End: 2022-12-27 | Stop reason: HOSPADM

## 2022-12-27 RX ORDER — MIDAZOLAM HYDROCHLORIDE 2 MG/2ML
INJECTION, SOLUTION INTRAMUSCULAR; INTRAVENOUS AS NEEDED
Status: DISCONTINUED | OUTPATIENT
Start: 2022-12-27 | End: 2022-12-27 | Stop reason: SURG

## 2022-12-27 RX ORDER — DIPHENHYDRAMINE HCL 25 MG
25 CAPSULE ORAL EVERY 6 HOURS PRN
Status: DISCONTINUED | OUTPATIENT
Start: 2022-12-27 | End: 2022-12-29 | Stop reason: HOSPADM

## 2022-12-27 RX ORDER — DIPHENHYDRAMINE HCL 50 MG/ML
25 VIAL (ML) INJECTION EVERY 6 HOURS PRN
Status: DISCONTINUED | OUTPATIENT
Start: 2022-12-27 | End: 2022-12-29 | Stop reason: HOSPADM

## 2022-12-27 RX ORDER — EPHEDRINE SULFATE 50 MG/ML
INJECTION, SOLUTION INTRAVENOUS AS NEEDED
Status: DISCONTINUED | OUTPATIENT
Start: 2022-12-27 | End: 2022-12-27 | Stop reason: SURG

## 2022-12-27 RX ORDER — ACETAMINOPHEN 500 MG
500 TABLET ORAL EVERY 6 HOURS PRN
COMMUNITY

## 2022-12-27 RX ORDER — PRAVASTATIN SODIUM 20 MG/1
20 TABLET ORAL DAILY
Status: DISCONTINUED | OUTPATIENT
Start: 2022-12-27 | End: 2022-12-29 | Stop reason: HOSPADM

## 2022-12-27 RX ORDER — ACETAMINOPHEN 325 MG/1
975 TABLET ORAL
Status: COMPLETED | OUTPATIENT
Start: 2022-12-27 | End: 2022-12-27

## 2022-12-27 RX ORDER — NAPROXEN SODIUM 220 MG/1
81 TABLET, FILM COATED ORAL 2 TIMES DAILY
Status: DISCONTINUED | OUTPATIENT
Start: 2022-12-27 | End: 2022-12-29 | Stop reason: HOSPADM

## 2022-12-27 RX ORDER — PANTOPRAZOLE SODIUM 40 MG/1
40 TABLET, DELAYED RELEASE ORAL DAILY
Status: DISCONTINUED | OUTPATIENT
Start: 2022-12-27 | End: 2022-12-29 | Stop reason: HOSPADM

## 2022-12-27 RX ORDER — AMLODIPINE BESYLATE 5 MG/1
5 TABLET ORAL DAILY
Status: DISCONTINUED | OUTPATIENT
Start: 2022-12-27 | End: 2022-12-29 | Stop reason: HOSPADM

## 2022-12-27 RX ORDER — OXYCODONE HYDROCHLORIDE 5 MG/1
5-10 TABLET ORAL EVERY 4 HOURS PRN
Status: DISCONTINUED | OUTPATIENT
Start: 2022-12-27 | End: 2022-12-29 | Stop reason: HOSPADM

## 2022-12-27 RX ORDER — BUPIVACAINE HYDROCHLORIDE 7.5 MG/ML
INJECTION, SOLUTION INTRASPINAL
Status: COMPLETED | OUTPATIENT
Start: 2022-12-27 | End: 2022-12-27

## 2022-12-27 RX ORDER — FENTANYL CITRATE 50 UG/ML
INJECTION, SOLUTION INTRAMUSCULAR; INTRAVENOUS AS NEEDED
Status: DISCONTINUED | OUTPATIENT
Start: 2022-12-27 | End: 2022-12-27 | Stop reason: SURG

## 2022-12-27 RX ORDER — ROPIVACAINE HYDROCHLORIDE 5 MG/ML
INJECTION, SOLUTION EPIDURAL; INFILTRATION; PERINEURAL
Status: DISCONTINUED | OUTPATIENT
Start: 2022-12-27 | End: 2022-12-27 | Stop reason: HOSPADM

## 2022-12-27 RX ORDER — ONDANSETRON HYDROCHLORIDE 2 MG/ML
4 INJECTION, SOLUTION INTRAVENOUS EVERY 8 HOURS PRN
Status: DISCONTINUED | OUTPATIENT
Start: 2022-12-27 | End: 2022-12-29 | Stop reason: HOSPADM

## 2022-12-27 RX ORDER — DEXTROSE 50 % IN WATER (D50W) INTRAVENOUS SYRINGE
25 AS NEEDED
Status: DISCONTINUED | OUTPATIENT
Start: 2022-12-27 | End: 2022-12-29 | Stop reason: HOSPADM

## 2022-12-27 RX ADMIN — CEFAZOLIN SODIUM 2 G: 2 INJECTION, SOLUTION INTRAVENOUS at 11:05

## 2022-12-27 RX ADMIN — SODIUM CHLORIDE 1000 MG: 9 INJECTION, SOLUTION INTRAVENOUS at 09:19

## 2022-12-27 RX ADMIN — KETOROLAC TROMETHAMINE 15 MG: 15 INJECTION, SOLUTION INTRAMUSCULAR; INTRAVENOUS at 15:02

## 2022-12-27 RX ADMIN — BUPIVACAINE HYDROCHLORIDE IN DEXTROSE 1.8 ML: 7.5 INJECTION, SOLUTION SUBARACHNOID at 10:42

## 2022-12-27 RX ADMIN — OXYCODONE HYDROCHLORIDE 5 MG: 5 TABLET ORAL at 17:46

## 2022-12-27 RX ADMIN — FENTANYL CITRATE 25 MCG: 50 INJECTION, SOLUTION INTRAMUSCULAR; INTRAVENOUS at 13:37

## 2022-12-27 RX ADMIN — CEFAZOLIN 2 G: 2 INJECTION, POWDER, FOR SOLUTION INTRAMUSCULAR; INTRAVENOUS at 18:37

## 2022-12-27 RX ADMIN — OXYCODONE HYDROCHLORIDE 5 MG: 5 TABLET ORAL at 21:11

## 2022-12-27 RX ADMIN — HYDROMORPHONE HYDROCHLORIDE 0.25 MG: 1 INJECTION, SOLUTION INTRAMUSCULAR; INTRAVENOUS; SUBCUTANEOUS at 11:48

## 2022-12-27 RX ADMIN — SENNOSIDES AND DOCUSATE SODIUM 1 TABLET: 50; 8.6 TABLET ORAL at 20:21

## 2022-12-27 RX ADMIN — KETOROLAC TROMETHAMINE 15 MG: 15 INJECTION, SOLUTION INTRAMUSCULAR; INTRAVENOUS at 21:11

## 2022-12-27 RX ADMIN — LIDOCAINE HYDROCHLORIDE 5 ML: 10 INJECTION, SOLUTION INFILTRATION; PERINEURAL at 11:10

## 2022-12-27 RX ADMIN — ACETAMINOPHEN 975 MG: 325 TABLET ORAL at 08:31

## 2022-12-27 RX ADMIN — ACETAMINOPHEN 650 MG: 325 TABLET, FILM COATED ORAL at 21:10

## 2022-12-27 RX ADMIN — FENTANYL CITRATE 50 MCG: 50 INJECTION INTRAMUSCULAR; INTRAVENOUS at 11:19

## 2022-12-27 RX ADMIN — FENTANYL CITRATE 25 MCG: 50 INJECTION, SOLUTION INTRAMUSCULAR; INTRAVENOUS at 13:55

## 2022-12-27 RX ADMIN — ONDANSETRON HYDROCHLORIDE 4 MG: 2 INJECTION, SOLUTION INTRAMUSCULAR; INTRAVENOUS at 17:20

## 2022-12-27 RX ADMIN — TRANEXAMIC ACID 1100 MG: 100 INJECTION INTRAVENOUS at 11:04

## 2022-12-27 RX ADMIN — MIDAZOLAM HYDROCHLORIDE 1 MG: 1 INJECTION, SOLUTION INTRAMUSCULAR; INTRAVENOUS at 10:35

## 2022-12-27 RX ADMIN — ASPIRIN 81 MG CHEWABLE TABLET 81 MG: 81 TABLET CHEWABLE at 20:21

## 2022-12-27 RX ADMIN — ACETAMINOPHEN 975 MG: 325 TABLET, FILM COATED ORAL at 08:31

## 2022-12-27 RX ADMIN — PROPOFOL 150 MG: 10 INJECTION, EMULSION INTRAVENOUS at 11:10

## 2022-12-27 RX ADMIN — EPHEDRINE SULFATE 10 MG: 50 INJECTION, SOLUTION INTRAVENOUS at 12:50

## 2022-12-27 RX ADMIN — ACETAMINOPHEN 650 MG: 325 TABLET, FILM COATED ORAL at 15:01

## 2022-12-27 RX ADMIN — SODIUM CHLORIDE: 9 INJECTION, SOLUTION INTRAVENOUS at 15:03

## 2022-12-27 RX ADMIN — HYDROMORPHONE HYDROCHLORIDE 0.25 MG: 1 INJECTION, SOLUTION INTRAMUSCULAR; INTRAVENOUS; SUBCUTANEOUS at 11:40

## 2022-12-27 RX ADMIN — CELECOXIB 400 MG: 200 CAPSULE ORAL at 08:32

## 2022-12-27 RX ADMIN — PRAVASTATIN SODIUM 20 MG: 20 TABLET ORAL at 21:11

## 2022-12-27 RX ADMIN — VANCOMYCIN HYDROCHLORIDE 1 G: 1 INJECTION, POWDER, LYOPHILIZED, FOR SOLUTION INTRAVENOUS at 11:04

## 2022-12-27 RX ADMIN — FENTANYL CITRATE 50 MCG: 50 INJECTION INTRAMUSCULAR; INTRAVENOUS at 11:15

## 2022-12-27 ASSESSMENT — COGNITIVE AND FUNCTIONAL STATUS - GENERAL
AFFECT: WFL
WALKING IN HOSPITAL ROOM: 3 - A LITTLE
STANDING UP FROM CHAIR USING ARMS: 3 - A LITTLE
TOILETING: 2 - A LOT
HELP NEEDED FOR BATHING: 3 - A LITTLE
MOVING TO AND FROM BED TO CHAIR: 3 - A LITTLE
HELP NEEDED FOR PERSONAL GROOMING: 3 - A LITTLE
DRESSING REGULAR UPPER BODY CLOTHING: 3 - A LITTLE
DRESSING REGULAR LOWER BODY CLOTHING: 2 - A LOT
CLIMB 3 TO 5 STEPS WITH RAILING: 3 - A LITTLE
AFFECT: WFL
EATING MEALS: 4 - NONE

## 2022-12-27 NOTE — HOSPITAL COURSE
Felicia is a 89 y.o. female admitted on 12/27/2022 with Primary osteoarthritis of right knee [M17.11]  S/P total knee replacement, right [Z96.651]. Principal problem is No Principal Problem: There is no principal problem currently on the Problem List. Please update the Problem List and refresh..    Past Medical History  Felicia has a past medical history of COVID (11/18/2022), COVID-19 vaccine series completed, GERD (gastroesophageal reflux disease), Hypertension, and Lipid disorder.    History of Present Illness   89 y.o. female s/p right total knee arthroplasty 12/27/22.

## 2022-12-27 NOTE — NURSING NOTE
Upon assessment, Pt had some mild swelling to the R of her mepilex to R knee.  Mepilex cdi.  Dr. Nicol lennon.

## 2022-12-27 NOTE — ANESTHESIA PROCEDURE NOTES
Spinal Block    Patient location during procedure: OR  Start time: 12/27/2022 10:41 AM  End time: 12/27/2022 10:45 AM  Staffing  Performed: anesthesiologist   Anesthesiologist: Yenni Heredia MD  Reason for block: at surgeon's request  Preanesthetic Checklist  Completed: patient identified, site marked, surgical consent, pre-op evaluation, timeout performed, IV checked, risks and benefits discussed, monitors and equipment checked and sterile field maintained during procedure  Spinal Block  Patient position: sitting  Prep: Betadine, ChloraPrep and site prepped and draped  Patient monitoring: heart rate, cardiac monitor, continuous pulse ox and blood pressure  Approach: midline  Location: L3-4  Injection technique: single-shot  Needle  Needle type: Sprotte   Needle gauge: 24 G  Needle length: 3.5 in  Assessment  Sensory level: T4  Events: cerebrospinal fluid  Additional Notes  Procedure well tolerated. Vital signs stable.    Medications Administered -   bupivacaine PF (MARCAINE SPINAL) 0.75% intrathecal injection - intrathecal, Back   1.8 mL - 12/27/2022 10:42:00 AM

## 2022-12-27 NOTE — PROGRESS NOTES
Patient: Felicia Puckett  Location:  [facility] 5PAV [id number]  MRN:  [id number]  Today's date:  12/27/2022     RN cleared for therapy, present intermittently t/o session and aware of hypotension/performance and pocket of swelling lat knee.    Pt left supine in bed c cold applied to R knee, R heel elevated, SCD on, call bell in reach, alarm active.     Felicia is a 89 y.o. female admitted on 12/27/2022 with Primary osteoarthritis of right knee [M17.11]  S/P total knee replacement, right [Z96.651]. Principal problem is No Principal Problem: There is no principal problem currently on the Problem List. Please update the Problem List and refresh..    Past Medical History  Felicia has a past medical history of COVID (11/18/2022), COVID-19 vaccine series completed, GERD (gastroesophageal reflux disease), Hypertension, and Lipid disorder.    History of Present Illness   89 y.o. female s/p right total knee arthroplasty 12/27/22.        PT Vitals    Date/Time Pulse HR Source Resp SpO2 Pt Activity O2 Therapy O2 Del Method O2 Flow Rate BP BP Location BP Method Pt Position [facility]   12/27/22 1535 77 Monitor -- 97 % At rest Supplemental oxygen Nasal cannula 2 L/min 146/65 Right upper arm Automatic Lying CK   12/27/22 1542 55 Monitor -- 98 % -- p sitting None (Room air) -- -- 115/55 Right upper arm Automatic Sitting CK   12/27/22 1545 57 Monitor -- -- -- -- -- -- 129/57 p seated ther ex Right upper arm Automatic Sitting CK   12/27/22 1548 -- -- -- -- -- -- -- -- 100/51 p standing Right upper arm Automatic Sitting CK   12/27/22 1552 -- -- 16 -- -- -- -- -- -- -- -- -- K(M   12/27/22 1559 77 Monitor -- -- -- -- -- -- 121/55 Right upper arm Automatic Lying CK      PT Pain    Date/Time Pain Type Location Rating: Rest Interventions [facility]   12/27/22 1535 Pain Assessment knee 5 cold applied;care clustered;position adjusted CK          Prior Living Environment    Flowsheet Row Most Recent Value   Current Living Arrangements home   Living  "Environment Comment lives alone in [facility] but will be going to dtrs in an in-law suite, [address], first floor set up, walk in shower        Prior Level of Function    Flowsheet Row Most Recent Value   Dominant Hand right   Ambulation assistive equipment   Transferring assistive equipment   Toileting independent   Bathing independent   Dressing independent   Eating independent   Communication understands/communicates without difficulty   Prior Level of Function Comment IND c SPC, works full time as a  traveling, (+)    Assistive Device Currently Used at Home cane, straight           PT Evaluation and Treatment - 12/27/22 1532        PT Time Calculation    Start Time 1532     Stop Time 1600     Time Calculation (min) 28 min        Session Details    Document Type initial evaluation     Mode of Treatment physical therapy   cotx c OT VV       General Information    Patient Profile Reviewed yes     Onset of Illness/Injury or Date of Surgery 12/27/22     Referring Physician Mariusz     Patient/Family/Caregiver Comments/Observations RN cleared, present at start of session, dtr present t/o     General Observations of Patient rec'd supine, agreeable to therapy, c/o \"queeziness\" sitting EOB, (+) ortho BP     Existing Precautions/Restrictions fall;weight bearing        Weight-bearing Status    Right LE Weight-Bearing Status weight-bearing as tolerated (WBAT)         Services    Do You Speak a Language Other Than English at Home? no     Is an  Needed/Used? No        Cognition/Psychosocial    Affect/Mental Status (Cognition) WFL     Orientation Status (Cognition) oriented x 4     Follows Commands (Cognition) WFL     Cognitive Function WFL        Sensory    Hearing Status hearing impairment, bilaterally        Vision Assessment/Intervention    Visual Impairment/Limitations corrective lenses full-time        Sensory Assessment (Somatosensory)    Sensory Assessment (Somatosensory) LE sensation intact "        Range of Motion (ROM)    Left Lower Extremity (ROM) left LE ROM is WFL     Right Lower Extremity (ROM) right LE ROM is WFL except;knee     Knee, Right (ROM) 10-95        Strength (Manual Muscle Testing)    Hip, Left (Strength) 4     Knee, Left (Strength) 4     Ankle, Left (Strength) 4     Hip, Right (Strength) 3+     Knee, Right (Strength) 3     Ankle, Right (Strength) 4        Edema Symptoms/Interventions    Description (Edema) localized     Associated Symptoms (Edema) pain;joint contracture/ROM impairments     Treatment Interventions (Edema) cryotherapy        Bed Mobility    [place], Supine to Sit minimum assist (75% or more patient effort)     [place], Sit to Supine minimum assist (75% or more patient effort)     Assistive Device head of bed elevated     Comment (Bed Mobility) OOB to L, assist for RLE management, pt otherwise moving well c good pacing and control        Sit to Stand Transfer    [place], Sit to Stand Transfer close supervision     Verbal Cues hand placement;safety;technique;proper use of assistive device     Assistive Device walker, front-wheeled     Comment from EOB, steady to rise, limited by (+) ortho BP        Stand to Sit Transfer    [place], Stand to Sit Transfer close supervision     Verbal Cues hand placement;proper use of assistive device;safety;technique     Assistive Device walker, front-wheeled     Comment to EOB, controlled descent        Gait Training    Distance in Feet 0 feet     Comment (Gait/Stairs) unable to be completed due to symptomatic orthostatic hypotension        Safety Issues, Functional Mobility    Impairments Affecting Function (Mobility) pain;range of motion (ROM);strength        Balance    Static Sitting Balance WFL;sitting, edge of bed     Dynamic Sitting Balance WFL;sitting, edge of bed     Static Standing Balance mild impairment;supported     Dynamic Standing Balance not tested        Motor Skills    Coordination WFL     Functional  Endurance fair (-), limited by dec BP        Therapeutic Exercise    Therapeutic Exercise lower extremity        Lower Extremity (Therapeutic Exercise)    Exercise Position/Type supine;seated     General Exercise bilateral;ankle pumps;marching while seated;gluteal sets;heel slides;quad sets;LAQ (long arc quad)     Reps and Sets ther ex seated EOB due to dec BP, improved but further dec c standing     Comment edu on ther ex to be completed supine or reclined in bedside chair until gym tomorrow        AM-PAC (TM) - Mobility (Current Function)    Turning from your back to your side while in a flat bed without using bedrails? 3 - A Little     Moving from lying on your back to sitting on the side of a flat bed without using bedrails? 3 - A Little     Moving to and from a bed to a chair? 3 - A Little     Standing up from a chair using your arms? 3 - A Little     To walk in a hospital room? 3 - A Little     Climbing 3-5 steps with a railing? 3 - A Little     AM-PAC (TM) Mobility Score 18        Assessment/Plan (PT)    Daily Outcome Statement Pt is an 89 yof seen for initial eval s/p R TKA. Completed bed mob c min A and STS transfer c cl S but unable to progress further due to symptomatic orthostatic hypotension. Cont services to address ROM, strength, and gait in order to inc mobility and independence. Recommend home c assist from dtr as needed, outpatient PT p acute healing. Will need RW     Rehab Potential good, to achieve stated therapy goals     Therapy Frequency daily     Planned Therapy Interventions balance training;bed mobility training;gait training;home exercise program;neuromuscular re-education;patient/family education;ROM (range of motion);stair training;strengthening;transfer training               PT Assessment/Plan    Flowsheet Row Most Recent Value   PT Recommended Discharge Disposition home with assistance at 12/27/2022 1532   Anticipated Equipment Needs at Discharge (PT) walker, front-wheeled at  12/27/2022 1532   Patient/Family Therapy Goals Statement to go home at 12/27/2022 1532                    Education Documentation  Transfers, taught by Lorie Sinha PT at 12/27/2022  4:27 PM.  Learner: Family, Patient  Readiness: Acceptance  Method: Explanation  Response: Verbalizes Understanding  Comment: Pt and dtr edu on role of PT/POC, ther ex and cold therapy for swelling/ROM/DVT prevention, use of RW-safety and sequencing for transfers    Bed Mobility, taught by Lorie Sinha PT at 12/27/2022  4:27 PM.  Learner: Family, Patient  Readiness: Acceptance  Method: Explanation  Response: Verbalizes Understanding  Comment: Pt and dtr edu on role of PT/POC, ther ex and cold therapy for swelling/ROM/DVT prevention, use of RW-safety and sequencing for transfers          PT Goals    Flowsheet Row Most Recent Value   Bed Mobility Goal 1    Activity/Assistive Device bed mobility activities, all at 12/27/2022 1532   [place] modified independence at 12/27/2022 1532   Time Frame by discharge at 12/27/2022 1532   Progress/Outcome goal ongoing at 12/27/2022 1532   Transfer Goal 1    Activity/Assistive Device all transfers at 12/27/2022 1532   [place] modified independence at 12/27/2022 1532   Time Frame by discharge at 12/27/2022 1532   Progress/Outcome goal ongoing at 12/27/2022 1532   Gait Training Goal 1    Activity/Assistive Device gait (walking locomotion), assistive device use at 12/27/2022 1532   [place] modified independence at 12/27/2022 1532   Distance 200 at 12/27/2022 1532   Time Frame by discharge at 12/27/2022 1532   Progress/Outcome goal ongoing at 12/27/2022 1532   ROM Goal 1    ROM Goal 1 R knee 0-90 at 12/27/2022 1532   Time Frame by discharge at 12/27/2022 1532   Progress/Outcome goal ongoing at 12/27/2022 1532   Stairs Goal 1    Activity/Assistive Device stairs, all skills, assistive device use at 12/27/2022 1532   [place] modified independence at 12/27/2022 1532   Number  of Stairs 1 at 12/27/2022 1532   Time Frame by discharge at 12/27/2022 1532   Progress/Outcome goal ongoing at 12/27/2022 1532

## 2022-12-27 NOTE — PROGRESS NOTES
Orthopaedic Surgery Postoperative Check    Physical Exam:  Follows commands  Symmetric chest rise bilaterally with normal respiratory effort    Musculoskeletal:    Right Lower Extremity:  Dressings clean, dry, intact  Fires tibialis anterior, extensor hallucis longus, gastrocnemius/soleus  Sensation intact to light touch in superficial peroneal, deep peroneal, saphenous, sural, tibial nerve distributions  Palpable dorsalis pedis pulse    Assessment & Plan:  89 y.o. female s/p right total knee arthroplasty    - Pain control  - Weight bearing status: WBAT  - DVT ppx: ASA81 BID x4wks  - Perioperative antibiotics  - PT/OT    Selvin Camarena MD  Orthopaedic Surgery

## 2022-12-27 NOTE — NURSING NOTE
Upon reassessment, pt unable to dorsiflex R foot without bending at the knee.  However, strength equal in b/l lower extremities.  Dr. Cornejo at bedside to assess patient.

## 2022-12-27 NOTE — PLAN OF CARE
Plan of Care Review  Plan of Care Reviewed With: patient  Progress: improving  Outcome Summary: OOBx2 with rolling walker d/t lightheadedness and nausea.  Up to BSC this shift.  pt hopeful for DC home tomorrow after PT

## 2022-12-27 NOTE — ANESTHESIOLOGIST PRE-PROCEDURE ATTESTATION
Pre-Procedure Patient Identification:  I am the Primary Anesthesiologist and have identified the patient on 12/27/22 at 10:17 AM.   I have confirmed the procedure(s) will be performed by the following surgeon/proceduralist Ross Vee MD.

## 2022-12-27 NOTE — PLAN OF CARE
PT eval complete. Recommend home c assist, eventual outpatient PT p acute healing as needed. Will need RW.

## 2022-12-27 NOTE — OP NOTE
Pre-Op Diagnosis:   Primary right knee osteoarthritis    Post-Op Diagnosis:   Same    Procedure: Right total knee arthroplasty    Surgeon:  Ross Vee MD    Assistant: Nunda    Anesthesia: Spinal plus general LMA    Esitmated Blood Loss: 40 cc    IV FLUIDS:  Per anesthesia record    Urine Output: none    Complications: none    Drains: none    Specimen: None    TOTAL TOURNIQUET TIME: Approximately 52 minutes at 250 mmHg    COMPONENTS USED: This was a cemented Linwood triathlon system with a size 3 right cruciate retaining cemented femoral component, size 3 primary tibial baseplate with a 3 x 13 mm thickness CS X3 polyethylene insert and a 32 mm asymmetric patellar component    INDICATIONS:  The patient presented to the office with end-stage degenerative joint disease of the knee refractory to conservative management. I did discuss with the patient the risks and benefits of knee replacement surgery. The patient understood that risks of surgery included, but were not limited to cardiovascular and/or cardiopulmonary compromise, venous thromboembolism, infection, bleeding, neurovascular injury, fracture, persistent pain, disability, loosening, stiffness, instability, and need for future surgical procedures. Despite these risks, I did feel that the patient was a candidate for knee replacement surgery given the history, physical examination, and radiographic findings consistent with end-stage degenerative joint disease of the knee, and failure to improve with conservative management. I felt that the patient could reasonably expect significant reduction in pain and improvement in function and quality of life with knee replacement surgery. The patient understood, agreed, and elected to undergo the operation.    OPERATIVE FINDINGS:  Included end stage degenerative joint disease of the knee consistent with the history, physical exam, and radiographic findings.    DESCRIPTION OF PROCEDURE:  The patient was placed on the OR  table in the supine position. After induction of anesthesia, all of the bony prominences and neurovascular structures were well padded, and we prepped and draped the lower extremity in the standard sterile fashion. We exsanguinated the limb with an Esmarch and inflated the tourniquet to 250 mmHg. We made an incision anteriorly, and made full-thickness skin flaps to allow for a standard medial parapatellar arthrotomy productive of benign-appearing synovial fluid. A standard approach was performed, elevating soft tissues off of the proximal tibia in a sub-periosteal fashion. A limited synovectomy was performed and the retropatellar fat pad was partially excised. The ACL was excised and the PCL was evaluated and excised only if deemed appropriate. The patellar mill was used to remove bone from the undersurface of the patella, leaving 14-16 mm of bone remaining. We sized the patella and drilled the lug holes for the patellar component.    We then gained access to the intramedullary canal of the femur with a step drill and placed the distal femoral cutting guide to an angle of 5 degrees of valgus relative to the femoral shaft. We set it to take 8 mm of bone and made our distal femoral resection. We then sized the femur using a posterior referencing technique, and set the rotation to the appropriate degree of external rotation relative to the posterior condylar axis which matched up well with Kanchan's line and the epicondylar axis. We pinned the block into place and made our anterior, posterior and chamfer cuts with the oscillating saw and removed the resected bone. We removed any remaining marginal and notch osteophytes, cut the notch cut, and turned our attention toward the tibia.    The tibia was subluxed anteriorly, and the menisci were removed in their entirety. We then placed the proximal tibial cutting guide perpendicular to the mechanical axis of the tibia, set to take an appropriate amount of bone relative to  the implant thickness, matching the patient's native slope, and setting the rotation aiming for junction of the medial and middle third of the tibial tubercle. We made our proximal tibial resection and removed the resected bone. We then removed all marginal osteophytes, sized the proximal tibia, and set the rotation with the punch, again aiming for the junction of the medial and middle third of the tibial tubercle. We then opened up the back of the knee with lamina spreaders and removed any remaining osteophytes, excess soft tissue, and meniscal tissue from the back of the knee.     We then placed trial components, and selected the polyethylene insert thickness that I felt gave us the best combination of range of motion and stability in flexion, mid flexion and extension. The patellar tracking was excellent with no liftoff. We drilled the lug holes or cut the box for the femoral component depending on the CR or PS construct, removed all trial components, and prepared the bony surfaces for cement using pulsatile saline mixed with antibiotic, gauze drying, and a 1/8-inch drill bit for sclerotic areas of bone. We then cemented all of our components into place, removed all excess cement, and allowed the cement to fully dry in full extension. When the cement was fully cured, we profusely irrigated out the knee with pulsatile saline mixed with antibiotic and let the tourniquet down. We achieved hemostasis with electrocautery. Once again, we checked our construct, and I felt that everything was appropriate, as it had been in the trial phase.  We then profusely irrigated out the knee once again with pulsatile saline mixed with antibiotic and repaired the arthrotomy with a combination o#2 Quill and  #1 Vicryl suture. The subcutaneous tissue was closed in 2 layers using #1 and 2-0 Vicryl suture, and the skin was closed with a running 4-0 subcuticular Monocryl suture and skin adhesive. Sterile dry dressings were placed. The  patient was taken to the recovery room in good condition. Following the case the patient had palpable pedal pulses with brisk capillary refill in the toes, which was consistent with the preoperative exam. I, Dr. Vee, was the attending for the case and was present for the critical portions of the procedure.    I certify that the services for which payment is claimed were medically necessary and that no qualified resident or fellow was available to perform the services    Ross Vee MD

## 2022-12-27 NOTE — ANESTHESIA PREPROCEDURE EVALUATION
Relevant Problems   CARDIOVASCULAR   (+) Primary hypertension      GASTROINTESTINAL   (+) GERD (gastroesophageal reflux disease)       Anesthesia ROS/MED HX    Anesthesia History    History of anesthetic complications  - PONV  Pulmonary - neg  Neuro/Psych - neg  Cardiovascular   hypertension   Cardiac clearance reviewed, Covid19 Test Reviewed and ECG reviewed  GI/Hepatic   GERD  Musculoskeletal- neg  Renal Disease- neg  Endo/Other- neg  Body Habitus: Normal  ROS/MED HX Comments:    Pulmonary: covid - 11/2022, tired  snores       Past Surgical History:   Procedure Laterality Date   • BLADDER SURGERY     • FINGER SURGERY     • HYSTERECTOMY         Physical Exam    Airway   Mallampati: II   TM distance: >3 FB   Neck ROM: full  Cardiovascular - normal   Rhythm: regular   Rate: normalPulmonary - normal   clear to auscultation  Dental - normal        Anesthesia Plan    Plan: spinal and MAC    Technique: spinal and MAC     Lines and Monitors: PIV     Airway: natural airway / supplemental oxygen   ASA 3  Anesthetic plan and risks discussed with: patient  Postop Plan:   Patient Disposition: inpatient floor planned admission   Pain Management: spinal and IV analgesics

## 2022-12-27 NOTE — PROGRESS NOTES
Patient:  Felicia Puckett  Location:  [facility] 5P 5458  MRN:  [id number]  Today's date:  12/27/2022     Start: RN aware and cleared for therapy. R'cved in bed , agreeable to OT session    End:  Pt left in bed w/ alarm activated. Call bell and personal items within reach. NAD and VSS. Nurse notified of session progress/outcome.    RN present and aware of drop in BP with attempts at OOB activity.      Felicia is a 89 y.o. female admitted on 12/27/2022 with Primary osteoarthritis of right knee [M17.11]  S/P total knee replacement, right [Z96.651]. Principal problem is No Principal Problem: There is no principal problem currently on the Problem List. Please update the Problem List and refresh..    Past Medical History  Felicia has a past medical history of COVID (11/18/2022), COVID-19 vaccine series completed, GERD (gastroesophageal reflux disease), Hypertension, and Lipid disorder.    History of Present Illness   89 y.o. female s/p right total knee arthroplasty 12/27/22.        OT Vitals    Date/Time Pulse HR Source Resp SpO2 Pt Activity O2 Therapy O2 Del Method O2 Flow Rate BP BP Location BP Method Pt Position [facility]   12/27/22 1535 77 Monitor -- 97 % At rest Supplemental oxygen Nasal cannula 2 L/min 146/65 Right upper arm Automatic Lying CK   12/27/22 1542 55 Monitor -- 98 % -- p sitting None (Room air) -- -- 115/55 Right upper arm Automatic Sitting CK   12/27/22 1545 57 Monitor -- -- -- -- -- -- 129/57 p seated ther ex Right upper arm Automatic Sitting CK   12/27/22 1548 -- -- -- -- -- -- -- -- 100/51 p standing Right upper arm Automatic Sitting CK   12/27/22 1552 -- -- 16 -- -- -- -- -- -- -- -- -- K(M   12/27/22 1559 77 Monitor -- -- -- -- -- -- 121/55 Right upper arm Automatic Lying CK      OT Pain    Date/Time Pain Type Location Rating: Rest Interventions [facility]   12/27/22 1535 Pain Assessment knee 5 cold applied;care clustered;position adjusted CK          Prior Living Environment    Flowsheet Row Most Recent  Value   Current Living Arrangements home   Living Environment Comment lives alone in [facility] but will be going to dtrs in an in-law suite, [address], first floor set up, walk in shower        Prior Level of Function    Flowsheet Row Most Recent Value   Dominant Hand right   Ambulation assistive equipment   Transferring assistive equipment   Toileting independent   Bathing independent   Dressing independent   Eating independent   Communication understands/communicates without difficulty   Prior Level of Function Comment IND c SPC, works full time as a  traveling, (+)    Assistive Device Currently Used at Home cane, straight        Occupational Profile    Flowsheet Row Most Recent Value   Reason for Services/Referral s/p R TKA   Successful Occupations works as    Environmental Supports and Barriers From home alone normally but plans to d/c dtr's house first.           OT Evaluation and Treatment - 12/27/22 1531        OT Time Calculation    Start Time 1531     Stop Time 1600     Time Calculation (min) 29 min        Session Details    Document Type initial evaluation     Mode of Treatment occupational therapy        General Information    Patient Profile Reviewed yes     Onset of Illness/Injury or Date of Surgery 12/27/22     Referring Physician Mariusz     Patient/Family/Caregiver Comments/Observations RN aware and cleared for therapy. Present during session at times and alerted to drop in BPs     General Observations of Patient Pt r'cved in bed, agreeable to OT/PT session     Existing Precautions/Restrictions fall;weight bearing        Weight-bearing Status    Right LE Weight-Bearing Status weight-bearing as tolerated (WBAT)        Cognition/Psychosocial    Affect/Mental Status (Cognition) WFL     Orientation Status (Cognition) oriented x 4     Follows Commands (Cognition) WFL     Cognitive Function WFL     Comment, Cognition Very pleasant and cooperative t/o session        Hearing Assessment     "Hearing Status hearing impairment, bilaterally   mildly [place] and required repetition or incr volume as needed       Vision Assessment/Intervention    Visual Impairment/Limitations corrective lenses full-time        Sensory Assessment    Sensory Assessment (Somatosensory) UE sensation intact        Range of Motion (ROM)    Range of Motion bilateral upper extremities;ROM is WFL     Comment: Range of Motion grossly assessed with [place] activity        Strength (Manual Muscle Testing)    Strength (Manual Muscle Testing) strength is WFL;bilateral upper extremities        Strength Comprehensive (MMT)    Comment grossly assessed with [place] activity        Bed Mobility    [place], Supine to Sit minimum assist (75% or more patient effort)     [place], Sit to Supine minimum assist (75% or more patient effort)     Assistive Device head of bed elevated     Comment (Bed Mobility) OOB to L. Min Ax1 for RLE support to/from EOB.        Transfers    Transfers other (see comments)     Maintains Weight-Bearing Status (Transfers) able to maintain     Comment Pt seated EOB and reported feeling a little \"queasy\". BP checked and had notable drop from 140s to 110s. Edu on LE Rosalind at EOB and BP elevated to . Attempted STS from EOB but pt reporting incr \"queasiness\" again and requesting to sit. Seated EOB and BP rechecked and noted to drop to 100s again. Returned to bed. RN present and aware. Further OOB activity deferred 2* safety considerations.        Sit to Stand Transfer    [place], Sit to Stand Transfer close supervision     Verbal Cues hand placement;technique;safety     Assistive Device walker, front-wheeled     Comment From EOB        Stand to Sit Transfer    [place], Stand to Sit Transfer close supervision;verbal cues     Verbal Cues hand placement;safety;technique     Assistive Device walker, front-wheeled     Comment To EOB.        Safety Issues, Functional Mobility    Impairments Affecting Function " (Mobility) balance;endurance/activity tolerance;pain;range of motion (ROM);strength        Balance    Balance Assessment sitting static balance;sitting dynamic balance;sit to stand dynamic balance;standing static balance;standing dynamic balance     Static Sitting Balance WFL;sitting, edge of bed     Dynamic Sitting Balance WFL;sitting, edge of bed     Sit to Stand Dynamic Balance mild impairment;supported     Static Standing Balance mild impairment;supported     Dynamic Standing Balance mild impairment;supported     Comment, Balance CLS for safe OOB activity w/ RW. No overt LOB noted. (+) orthostatic and symptomatic. Returned to bed        Upper Body Dressing    Tasks hospital gown     [place] moderate assist (50-74% patient effort)     Position supine     Comment to change wet gown        Lower Body Dressing    Tasks socks     Story maximum assist (25-49% patient effort)     Position supine     Comment 2* dcr func reach and LE mobility        Toileting    Comment incontinent of bladder while in bed. (A) to change pads and sheets        AM-PAC (TM) - ADL (Current Function)    Putting on and taking off regular lower body clothing? 2 - A Lot     Bathing? 3 - A Little     Toileting? 2 - A Lot     Putting on/taking off regular upper body clothing? 3 - A Little     How much help for taking care of personal grooming? 3 - A Little     Eating meals? 4 - None     AM-PAC (TM) ADL Score 17        Assessment/Plan (OT)    Daily Outcome Statement OT eval completed. Pt is a 89 y.o. female s/p R TKA. MIN Ax1 for bed mobility, CLS for STS and func mobility w/ RW. MOD A for UBD and MAX A for LBD. Incont of bladder s/p spinal anesthesia. Pt limited by pain, dcr activity tolerance, balance, strength/ROM, and (+) orthostatics while attempting OOB activity. OT to cont w/ POC in acute setting. Rec d/c home w/ (A) pending ortho class progress     Rehab Potential good, to achieve stated therapy goals     Therapy Frequency daily      Planned Therapy Interventions activity tolerance training;adaptive equipment training;BADL retraining;functional balance retraining;occupation/activity based interventions;patient/caregiver education/training;transfer/mobility retraining               OT Assessment/Plan    Flowsheet Row Most Recent Value   OT Recommended Discharge Disposition home with assistance at 12/27/2022 1531   Anticipated Equipment Needs At Discharge (OT) walker, front-wheeled  [has built-in shower bench, SPC] at 12/27/2022 1531   Patient/Family Therapy Goal Statement To return home at d/c at 12/27/2022 1531                    Education Documentation  Activity, taught by Isa Barahona, OT at 12/27/2022  4:28 PM.  Learner: Family, Patient  Readiness: Acceptance  Method: Explanation  Response: Verbalizes Understanding  Comment: OT POC/role, safety and tech w/ func transfers and mobility w/ RW, safety w/ ADLs, WB status, ortho class, and d/c planning          OT Goals    Flowsheet Row Most Recent Value   Bed Mobility Goal 1    Activity/Assistive Device bed mobility activities, all at 12/27/2022 1531   [place] modified independence at 12/27/2022 1531   Time Frame 3-5 days at 12/27/2022 1531   Progress/Outcome goal ongoing at 12/27/2022 1531   Transfer Goal 1    Activity/Assistive Device toilet at 12/27/2022 1531   [place] modified independence at 12/27/2022 1531   Time Frame 3-5 days at 12/27/2022 1531   Progress/Outcome goal ongoing at 12/27/2022 1531   Transfer Goal 2    Activity/Assistive Device shower at 12/27/2022 1531   [place] supervision required at 12/27/2022 1531   Time Frame 3-5 days at 12/27/2022 1531   Progress/Outcome goal ongoing at 12/27/2022 1531   Dressing Goal 1    Activity/Adaptive Equipment lower body dressing at 12/27/2022 1531   [place] modified independence at 12/27/2022 1531   Time Frame 3-5 days at 12/27/2022 1531   Strategies/Barriers LH AE edu at 12/27/2022 1531   Progress/Outcome goal ongoing at  12/27/2022 1531

## 2022-12-27 NOTE — ASSESSMENT & PLAN NOTE
S/p Right total knee arthroplasty  Cont w pain management. rec PT/OT eval and treat. Further management per ortho surgery team + monitor. rec DVT proph per ortho rec.    My colleague, Dr. Herrera from [facility] will be on duty tomorrow at 0700 am for further assistance medically. Please notify her with any medical questions or concerns

## 2022-12-27 NOTE — PLAN OF CARE
Problem: Adult Inpatient Plan of Care  Goal: Plan of Care Review  Outcome: Progressing  Flowsheets (Taken 12/27/2022 6537)  Progress: improving  Plan of Care Reviewed With: patient  Outcome Summary: OT eval completed. Rec d/c home w/ (A) pending ortho class tomorrow

## 2022-12-27 NOTE — CONSULTS
Hospital Medicine Service -  Daily Progress Note       SUBJECTIVE   Interval History: pt seen and examined in the PACU. Resting in bed. No chest pain/dyspnea.      OBJECTIVE      Vital signs in last 24 hours:  Temp:  [36.1 °C (96.9 °F)-36.6 °C (97.8 °F)] 36.1 °C (96.9 °F)  Heart Rate:  [60-79] 79  Resp:  [16] 16  BP: (132-169)/(61-67) 132/61    Intake/Output Summary (Last 24 hours) at 12/27/2022 1315  Last data filed at 12/27/2022 1250  Gross per 24 hour   Intake 541 ml   Output --   Net 541 ml       PHYSICAL EXAMINATION      Physical Exam  Constitutional:       Appearance: She is not diaphoretic.   Eyes:      General: No scleral icterus.     Extraocular Movements: Extraocular movements intact.   Cardiovascular:      Rate and Rhythm: Regular rhythm.      Heart sounds:     No friction rub. No gallop.   Pulmonary:      Effort: Pulmonary effort is normal. No respiratory distress.      Breath sounds: No stridor. No wheezing, rhonchi or rales.   Abdominal:      General: Bowel sounds are normal.      Palpations: Abdomen is soft.   Skin:     General: Skin is warm.   Neurological:      Mental Status: She is alert.      Comments: Alert/awake. No slurred speech        LINES, CATHETERS, DRAINS, AIRWAYS, AND WOUNDS   Lines, Drains, Airways, Wounds:       Comments:      LABS / IMAGING / TELE      Labs            IMAGING  No results found.    ASSESSMENT AND PLAN      S/P knee surgery  Assessment & Plan  S/p Right total knee arthroplasty  Cont w pain management. rec PT/OT eval and treat. Further management per ortho surgery team. rec DVT proph per ortho rec.      GERD (gastroesophageal reflux disease)  Assessment & Plan  Cont w PPI    Hyperlipidemia  Assessment & Plan  Cont w statin    Primary hypertension  Assessment & Plan  Cont w norvasc and monitor.          VTE Prophylaxis Plan: Pharmacological DVT prophylaxis and timing of such per the discretion of the surgeon. Strongly recommend maintain sequential compression  device  Code Status: No Order  Estimated Discharge Date: 12/28/2022     Johan Ayala DO  12/27/2022

## 2022-12-27 NOTE — OR SURGEON
Pre-Procedure patient identification:  I am the primary operating surgeon/proceduralist and I have identified the patient and confirmed laterality is right on 12/27/22 at 9:22 AM Ross Vee MD  Phone Number: [phone] Pre-Procedure patient identification:  I am the primary operating surgeon/proceduralist and I have identified the patient on 12/27/22 at 9:22 AM Ross Vee MD  Phone Number: [phone]

## 2022-12-28 PROBLEM — D64.9 ANEMIA: Status: ACTIVE | Noted: 2022-12-28

## 2022-12-28 LAB
ANION GAP SERPL CALC-SCNC: 8 MEQ/L (ref 3–15)
BUN SERPL-MCNC: 18 MG/DL (ref 8–20)
CALCIUM SERPL-MCNC: 8.5 MG/DL (ref 8.9–10.3)
CHLORIDE SERPL-SCNC: 101 MEQ/L (ref 98–109)
CO2 SERPL-SCNC: 24 MEQ/L (ref 22–32)
CREAT SERPL-MCNC: 0.7 MG/DL (ref 0.6–1.1)
ERYTHROCYTE [DISTWIDTH] IN BLOOD BY AUTOMATED COUNT: 13.9 % (ref 11.7–14.4)
GFR SERPL CREATININE-BSD FRML MDRD: >60 ML/MIN/1.73M*2
GLUCOSE SERPL-MCNC: 112 MG/DL (ref 70–99)
HCT VFR BLDCO AUTO: 30.3 % (ref 35–45)
HGB BLD-MCNC: 9.7 G/DL (ref 11.8–15.7)
MCH RBC QN AUTO: 31.5 PG (ref 28–33.2)
MCHC RBC AUTO-ENTMCNC: 32 G/DL (ref 32.2–35.5)
MCV RBC AUTO: 98.4 FL (ref 83–98)
PDW BLD AUTO: 10.3 FL (ref 9.4–12.3)
PLATELET # BLD AUTO: 196 K/UL (ref 150–369)
POTASSIUM SERPL-SCNC: 3.5 MEQ/L (ref 3.6–5.1)
RBC # BLD AUTO: 3.08 M/UL (ref 3.93–5.22)
SODIUM SERPL-SCNC: 133 MEQ/L (ref 136–144)
WBC # BLD AUTO: 9.36 K/UL (ref 3.8–10.5)

## 2022-12-28 PROCEDURE — 97116 GAIT TRAINING THERAPY: CPT | Mod: GP,CQ

## 2022-12-28 PROCEDURE — 97535 SELF CARE MNGMENT TRAINING: CPT | Mod: GO

## 2022-12-28 PROCEDURE — 63700000 HC SELF-ADMINISTRABLE DRUG: Performed by: NURSE PRACTITIONER

## 2022-12-28 PROCEDURE — 80048 BASIC METABOLIC PNL TOTAL CA: CPT | Performed by: NURSE PRACTITIONER

## 2022-12-28 PROCEDURE — 99225 PR SBSQ OBSERVATION CARE/DAY 25 MINUTES: CPT | Performed by: HOSPITALIST

## 2022-12-28 PROCEDURE — 25800000 HC PHARMACY IV SOLUTIONS: Performed by: NURSE PRACTITIONER

## 2022-12-28 PROCEDURE — 36415 COLL VENOUS BLD VENIPUNCTURE: CPT | Performed by: NURSE PRACTITIONER

## 2022-12-28 PROCEDURE — 85027 COMPLETE CBC AUTOMATED: CPT | Performed by: NURSE PRACTITIONER

## 2022-12-28 PROCEDURE — 63600000 HC DRUGS/DETAIL CODE: Performed by: NURSE PRACTITIONER

## 2022-12-28 RX ORDER — POTASSIUM CHLORIDE 750 MG/1
40 TABLET, EXTENDED RELEASE ORAL ONCE
Status: COMPLETED | OUTPATIENT
Start: 2022-12-28 | End: 2022-12-28

## 2022-12-28 RX ORDER — OXYCODONE HYDROCHLORIDE 5 MG/1
5-10 TABLET ORAL EVERY 4 HOURS PRN
Qty: 50 TABLET | Refills: 0
Start: 2022-12-28 | End: 2023-01-02

## 2022-12-28 RX ORDER — AMOXICILLIN 250 MG
1 CAPSULE ORAL 2 TIMES DAILY
Qty: 60 TABLET | Refills: 0
Start: 2022-12-28 | End: 2024-07-01

## 2022-12-28 RX ORDER — NAPROXEN SODIUM 220 MG/1
81 TABLET, FILM COATED ORAL 2 TIMES DAILY
Qty: 60 TABLET | Refills: 0
Start: 2022-12-28 | End: 2024-07-01

## 2022-12-28 RX ORDER — UBIDECARENONE 100 MG
100 CAPSULE ORAL DAILY
Qty: 30 CAPSULE | Refills: 0
Start: 2022-12-28 | End: 2024-07-01

## 2022-12-28 RX ORDER — POLYETHYLENE GLYCOL 3350 17 G/17G
17 POWDER, FOR SOLUTION ORAL DAILY PRN
Start: 2022-12-28 | End: 2024-07-01

## 2022-12-28 RX ADMIN — ACETAMINOPHEN 650 MG: 325 TABLET, FILM COATED ORAL at 09:41

## 2022-12-28 RX ADMIN — SENNOSIDES AND DOCUSATE SODIUM 1 TABLET: 50; 8.6 TABLET ORAL at 08:35

## 2022-12-28 RX ADMIN — ACETAMINOPHEN 650 MG: 325 TABLET, FILM COATED ORAL at 15:36

## 2022-12-28 RX ADMIN — KETOROLAC TROMETHAMINE 15 MG: 15 INJECTION, SOLUTION INTRAMUSCULAR; INTRAVENOUS at 03:17

## 2022-12-28 RX ADMIN — SENNOSIDES AND DOCUSATE SODIUM 1 TABLET: 50; 8.6 TABLET ORAL at 19:56

## 2022-12-28 RX ADMIN — OXYCODONE HYDROCHLORIDE 10 MG: 5 TABLET ORAL at 01:23

## 2022-12-28 RX ADMIN — KETOROLAC TROMETHAMINE 15 MG: 15 INJECTION, SOLUTION INTRAMUSCULAR; INTRAVENOUS at 15:38

## 2022-12-28 RX ADMIN — AMLODIPINE BESYLATE 5 MG: 5 TABLET ORAL at 08:35

## 2022-12-28 RX ADMIN — ACETAMINOPHEN 650 MG: 325 TABLET, FILM COATED ORAL at 03:17

## 2022-12-28 RX ADMIN — KETOROLAC TROMETHAMINE 15 MG: 15 INJECTION, SOLUTION INTRAMUSCULAR; INTRAVENOUS at 09:42

## 2022-12-28 RX ADMIN — POLYETHYLENE GLYCOL 3350 17 G: 17 POWDER, FOR SOLUTION ORAL at 08:37

## 2022-12-28 RX ADMIN — PRAVASTATIN SODIUM 20 MG: 20 TABLET ORAL at 22:31

## 2022-12-28 RX ADMIN — POTASSIUM CHLORIDE 40 MEQ: 750 TABLET, EXTENDED RELEASE ORAL at 11:55

## 2022-12-28 RX ADMIN — ACETAMINOPHEN 650 MG: 325 TABLET, FILM COATED ORAL at 22:31

## 2022-12-28 RX ADMIN — SODIUM CHLORIDE: 9 INJECTION, SOLUTION INTRAVENOUS at 04:40

## 2022-12-28 RX ADMIN — OXYCODONE HYDROCHLORIDE 5 MG: 5 TABLET ORAL at 08:35

## 2022-12-28 RX ADMIN — ASPIRIN 81 MG CHEWABLE TABLET 81 MG: 81 TABLET CHEWABLE at 19:56

## 2022-12-28 RX ADMIN — DEXAMETHASONE SODIUM PHOSPHATE 8 MG: 4 INJECTION, SOLUTION INTRA-ARTICULAR; INTRALESIONAL; INTRAMUSCULAR; INTRAVENOUS; SOFT TISSUE at 05:14

## 2022-12-28 RX ADMIN — PANTOPRAZOLE SODIUM 40 MG: 40 TABLET, DELAYED RELEASE ORAL at 08:35

## 2022-12-28 RX ADMIN — CEFAZOLIN 2 G: 2 INJECTION, POWDER, FOR SOLUTION INTRAMUSCULAR; INTRAVENOUS at 03:18

## 2022-12-28 RX ADMIN — KETOROLAC TROMETHAMINE 15 MG: 15 INJECTION, SOLUTION INTRAMUSCULAR; INTRAVENOUS at 22:31

## 2022-12-28 RX ADMIN — ASPIRIN 81 MG CHEWABLE TABLET 81 MG: 81 TABLET CHEWABLE at 08:35

## 2022-12-28 ASSESSMENT — COGNITIVE AND FUNCTIONAL STATUS - GENERAL
AFFECT: WFL
WALKING IN HOSPITAL ROOM: 4 - NONE
CLIMB 3 TO 5 STEPS WITH RAILING: 3 - A LITTLE
DRESSING REGULAR UPPER BODY CLOTHING: 3 - A LITTLE
AFFECT: WFL
EATING MEALS: 4 - NONE
HELP NEEDED FOR BATHING: 3 - A LITTLE
TOILETING: 2 - A LOT
HELP NEEDED FOR PERSONAL GROOMING: 3 - A LITTLE
DRESSING REGULAR LOWER BODY CLOTHING: 2 - A LOT
STANDING UP FROM CHAIR USING ARMS: 3 - A LITTLE
MOVING TO AND FROM BED TO CHAIR: 3 - A LITTLE

## 2022-12-28 NOTE — ANESTHESIA POSTPROCEDURE EVALUATION
Patient: Felicia Puckett    Procedure Summary     Date: 12/27/22 Room / Location: [facility] OR  / [facility] OR [facility]    Anesthesia Start: 1035 Anesthesia Stop: 1252    Procedure: Right Total Knee Arthroplasty (Right: Knee) Diagnosis:       Primary osteoarthritis of right knee      (Unilateral Primary Osteoarthritis Right Knee)    Surgeons: Ross Vee MD Responsible Provider: Yenni Heredia MD    Anesthesia Type: spinal, MAC ASA Status: 3          Anesthesia Type: spinal, MAC  PACU Vitals  12/27/2022 1240 - 12/27/2022 1340      12/27/2022  1251 12/27/2022  1315 12/27/2022  1330 12/27/2022  1337    BP: 132/61 138/66 -- 147/63    Temp: 36.1 °C (96.9 °F) -- -- --    Pulse: 79 73 70 73    Resp: 16 18 21 18    SpO2: 100 % 100 % 100 % 100 %            Anesthesia Post Evaluation    Pain management: adequate  Patient location during evaluation: PACU  Patient participation: complete - patient participated  Level of consciousness: awake and alert  Cardiovascular status: acceptable  Airway Patency: adequate  Respiratory status: acceptable  Hydration status: acceptable  Anesthetic complications: no  Comments: Pt had failed spinAL, lma PLACED

## 2022-12-28 NOTE — PROGRESS NOTES
I personally evaluated this patient at 7:00 PM last night.  At that time she had full sensation and full motor strength in the superficial and deep peroneal distributions.  She had full dorsiflexion of the foot and the EHL.  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  As of 7:00 last night her neurologic exam was normal.  I have seen this patient multiple times today.  At 3:00 in the morning through neurovascular checks it was discovered that she had a developing foot drop.  We instituted appropriate measures including bending her knee over a pillow and her symptoms have improved since then.  Throughout the day today she has had a fluctuating neurologic exam.  Most recently and most consistently she has a patchy sensation in the superficial peroneal distribution, and minimal sensation in the deep peroneal distribution.  She has been able to fire her EHL, and this morning she was able to fire her tibialis anterior tendon, but most recently she has not been firing her tibialis anterior while still being able to fire her EHL.  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  While I anticipate that this will improve with time, I did indicate to the patient and her family that there is a chance that this will remain a deficit for her going forward.  We need to remain vigilant and protect her with the AFO and with appropriate leg positions to give the nerve the best chance to heal.  She is understanding of that and we will see her in the office to continue to monitor.

## 2022-12-28 NOTE — PROGRESS NOTES
Patient: Felicia Puckett  Location:  [facility] 5PAV 5458  MRN:  [id number]  Today's date:  12/28/2022    General Observations    Start:    Pt received reclined in chair; Felicia was agreeable to therapy.    End: Pt reclined in chair at end of session; call bell in reach, chair alarm activated and personal items in reach.    Felicia is a 89 y.o. female admitted on 12/27/2022 with Primary osteoarthritis of right knee [M17.11]  S/P total knee replacement, right [Z96.651]. Principal problem is No Principal Problem: There is no principal problem currently on the Problem List. Please update the Problem List and refresh..    Past Medical History  Felicia has a past medical history of COVID (11/18/2022), COVID-19 vaccine series completed, GERD (gastroesophageal reflux disease), Hypertension, and Lipid disorder.    History of Present Illness   89 y.o. female s/p right total knee arthroplasty 12/27/22.        PT Vitals    Date/Time Pulse SpO2 BP [facility]   12/28/22 1248 67 97 % 125/61 GJG      PT Pain    Date/Time Pain Type [facility]   12/28/22 1248 Pain Assessment GJG          Prior Living Environment    Flowsheet Row Most Recent Value   Current Living Arrangements home   Living Environment Comment lives alone in [facility] but will be going to [facility] in an in-law suite, [address], first floor set up, walk in shower        Prior Level of Function    Flowsheet Row Most Recent Value   Dominant Hand right   Ambulation assistive equipment   Transferring assistive equipment   Toileting independent   Bathing independent   Dressing independent   Eating independent   Communication understands/communicates without difficulty   Prior Level of Function Comment IND c SPC, works full time as a  traveling, (+)    Assistive Device Currently Used at Home cane, straight           PT Evaluation and Treatment - 12/28/22 1248        PT Time Calculation    Start Time 1248     Stop Time 1333     Time Calculation (min) 45 min        Session Details     Document Type daily treatment/progress note     Mode of Treatment physical therapy        General Information    Patient Profile Reviewed yes     General Observations of Patient Pt rec'd at bedside, cleared for therapy by RN     Existing Precautions/Restrictions fall;weight bearing   RLE foot drop, per MD patient is to have a pillow under her knee       Weight-bearing Status    Right LE Weight-Bearing Status weight-bearing as tolerated (WBAT)        Cognition/Psychosocial    Affect/Mental Status (Cognition) WFL     Orientation Status (Cognition) oriented x 4     Follows Commands (Cognition) WFL     Cognitive Function WFL        Sensory    Hearing Status hearing impairment, bilaterally        Vision Assessment/Intervention    Visual Impairment/Limitations corrective lenses full-time        Range of Motion (ROM)    Right Lower Extremity (ROM) knee     Knee, Right (ROM) AROM 10-90        Bed Mobility    Comment (Bed Mobility) Pt rec'd OOB        Transfers    Transfers car transfer     Maintains Weight-Bearing Status (Transfers) able to maintain        Sit to Stand Transfer    [place], Sit to Stand Transfer supervision     Verbal Cues hand placement;safety;technique     Assistive Device walker, front-wheeled     Comment VC's for safe techniques + body mechanics, nose over toes, feet under PERFECTO. VC’s for hand placement.        Stand to Sit Transfer    [place], Stand to Sit Transfer supervision     Verbal Cues hand placement;technique;safety     Assistive Device walker, front-wheeled     Comment Pt req'd VC's for safe techniques. Vc's for slow descent.        Car Transfer    Transfer Technique stand-sit;sit-stand     [place], Car Transfer supervision     Assistive Device walker, front-wheeled     Comment VC +TC's needed for proper technique w/ hand placement for transfers. Tendency to pull up from the walker. Verbalized understanding of techniques educated.        Gait Training    [place], Gait  supervision     Assistive Device walker, front-wheeled     Distance in Feet 280 feet     Pattern (Gait) step-through     Deviations/Abnormal Patterns (Gait) antalgic;gait speed decreased;step length decreased;stride length decreased;weight shifting decreased     Maintains Weight-bearing Status (Gait) able to maintain     Advanced Gait Activity curb negotiation;10 Meter Walk Test (Self-Selected Velocity)     Comment (Gait/Stairs) Vc's for heel strike and toe off, with step-through sequencing technique. Verbalized understanding of techniques educated.        Curb Negotiation    [place] supervision     Assistive Device walker, front-wheeled     Comment Vc's needed for proper sequencing with progression of RW, body positioning within walker and step to technique. Verbalized understanding of techniques educated.        10 Meter Walk Test (Self-Selected Velocity)    Trial One: Ten Meter Walk Test (sec) 25 seconds     Trial Two: Ten Meter Walk Test (sec) 38 seconds     Trial One: Gait Speed (m/s) 0.24 m/s     Trial Two: Gait Speed (m/s) 0.16 m/s     Average Gait Speed (m/s): Two Trials 0.2 m/s        Balance    Balance Assessment sitting static balance;sitting dynamic balance;sit to stand dynamic balance;standing static balance;standing dynamic balance     Static Sitting Balance WFL     Dynamic Sitting Balance WFL     Sit to Stand Dynamic Balance mild impairment     Static Standing Balance mild impairment     Dynamic Standing Balance mild impairment     Comment, Balance Supervision with funcitonal mobility        Therapeutic Exercise    Therapeutic Exercise lower extremity        Lower Extremity (Therapeutic Exercise)    Exercise Position/Type seated     General Exercise bilateral;LAQ (long arc quad)     Reps and Sets x10     Comment Pt educated on TE's to perform to increase functional ROM and strength of BLE's.        AM-PAC (TM) - Mobility (Current Function)    Turning from your back to your side while in a flat  bed without using bedrails? 3 - A Little     Moving from lying on your back to sitting on the side of a flat bed without using bedrails? 3 - A Little     Moving to and from a bed to a chair? 3 - A Little     Standing up from a chair using your arms? 3 - A Little     To walk in a hospital room? 4 - None     Climbing 3-5 steps with a railing? 3 - A Little     AM-PAC (TM) Mobility Score 19        Assessment/Plan (PT)    Daily Outcome Statement Ax1 supervision for functional mobility. Training conducted with negotiation of safe functional mobility techniques which included gait w/ RW and transfer techniques. Pt. demo good carryover with instructions. Also educated in application of surgical precautions with daily activities/mobility. Educated on TE's to assist with recovery following surgery to promote healing and strengthening of BLE's. Pt's family not present for treatment. Pt educated on how family can provide assistance / supervision for safety. Therapy will follow up with pt again tomorrow once pt has shoes her AFO will fit in.               PT Assessment/Plan    Flowsheet Row Most Recent Value   PT Recommended Discharge Disposition home with assistance at 12/27/2022 1532   Anticipated Equipment Needs at Discharge (PT) walker, front-wheeled at 12/27/2022 1532   Patient/Family Therapy Goals Statement to go home at 12/27/2022 1532               Equipment Provided: Walker, with Wheels, Adult   Vendor: [employer]         PT Goals    Flowsheet Row Most Recent Value   Bed Mobility Goal 1    Activity/Assistive Device bed mobility activities, all at 12/27/2022 1532   [place] modified independence at 12/27/2022 1532   Time Frame by discharge at 12/27/2022 1532   Progress/Outcome goal ongoing at 12/27/2022 1532   Transfer Goal 1    Activity/Assistive Device all transfers at 12/27/2022 1532   [place] modified independence at 12/27/2022 1532   Time Frame by discharge at 12/27/2022 1532    Progress/Outcome goal ongoing at 12/27/2022 1532   Gait Training Goal 1    Activity/Assistive Device gait (walking locomotion), assistive device use at 12/27/2022 1532   [place] modified independence at 12/27/2022 1532   Distance 200 at 12/27/2022 1532   Time Frame by discharge at 12/27/2022 1532   Progress/Outcome goal ongoing at 12/27/2022 1532   ROM Goal 1    ROM Goal 1 R knee 0-90 at 12/27/2022 1532   Time Frame by discharge at 12/27/2022 1532   Progress/Outcome goal ongoing at 12/27/2022 1532   Stairs Goal 1    Activity/Assistive Device stairs, all skills, assistive device use at 12/27/2022 1532   [place] modified independence at 12/27/2022 1532   Number of Stairs 1 at 12/27/2022 1532   Time Frame by discharge at 12/27/2022 1532   Progress/Outcome goal ongoing at 12/27/2022 1532

## 2022-12-28 NOTE — PROGRESS NOTES
Patient:  Felicia Puckett  Location:  [facility] 5PAV 5458  MRN:  [id number]  Today's date:  12/28/2022    General Observations  Start: Pt received seated in chair; she was agreeable to therapy and no issues or concerns identified by nurse prior to session   End: Pt seated in chair at end of session; call bell in reach, chair alarm activated, all needs met, personal items in reach and nurse notified of patient performance and patient's position    Felicia is a 89 y.o. female admitted on 12/27/2022 with Primary osteoarthritis of right knee [M17.11]  S/P total knee replacement, right [Z96.651]. Principal problem is No Principal Problem: There is no principal problem currently on the Problem List. Please update the Problem List and refresh..    Past Medical History  Felicia has a past medical history of COVID (11/18/2022), COVID-19 vaccine series completed, GERD (gastroesophageal reflux disease), Hypertension, and Lipid disorder.    History of Present Illness   89 y.o. female s/p right total knee arthroplasty 12/27/22.        OT Vitals    Date/Time Pulse SpO2 BP Who   12/28/22 1248 67 97 % 125/61 GJG      OT Pain    Date/Time Pain Type Who   12/28/22 1248 Pain Assessment GJG          Prior Living Environment    Flowsheet Row Most Recent Value   Current Living Arrangements home   Living Environment Comment lives alone in [facility] but will be going to [facility] in an in-law suite, [address], first floor set up, walk in shower        Prior Level of Function    Flowsheet Row Most Recent Value   Dominant Hand right   Ambulation assistive equipment   Transferring assistive equipment   Toileting independent   Bathing independent   Dressing independent   Eating independent   Communication understands/communicates without difficulty   Prior Level of Function Comment IND c SPC, works full time as a  traveling, (+)    Assistive Device Currently Used at Home cane, straight        Occupational Profile    Flowsheet Row Most  Recent Value   Reason for Services/Referral s/p R TKA   Successful Occupations works as    Environmental Supports and Barriers From home alone normally but plans to d/c dtr's house first.           OT Evaluation and Treatment - 12/28/22 1247        OT Time Calculation    Start Time 1247     Stop Time 1333     Time Calculation (min) 46 min        Session Details    Document Type daily treatment/progress note     Mode of Treatment occupational therapy        General Information    Patient Profile Reviewed yes     Onset of Illness/Injury or Date of Surgery 12/27/22     Referring Physician Mariusz     Patient/Family/Caregiver Comments/Observations RN cleared for OT session     General Observations of Patient pt in chair, BLEs elevated with pillow per MD     Existing Precautions/Restrictions fall;weight bearing   RLE foot drop, per MD patient is to have a pillow under her knee       Weight-bearing Status    Right LE Weight-Bearing Status weight-bearing as tolerated (WBAT)        Cognition/Psychosocial    Affect/Mental Status (Cognition) WFL     Orientation Status (Cognition) oriented x 4     Follows Commands (Cognition) WFL     Cognitive Function WFL        Transfers    Transfers other (see comments)        Sit to Stand Transfer    [place], Sit to Stand Transfer supervision;verbal cues     Verbal Cues hand placement;technique;safety;proper use of assistive device     Assistive Device walker, front-wheeled     Comment cues for balance, RW Use, and energy conservation. walked in room, hallway, therapy gym. limited by RLE foor drop        Stand to Sit Transfer    [place], Stand to Sit Transfer verbal cues;supervision     Verbal Cues hand placement;technique;safety;proper use of assistive device     Assistive Device walker, front-wheeled     Comment cues for body mechanics        Toilet Transfer    Transfer Technique sit-stand;stand-sit     [place], Toilet Transfer verbal cues;supervision     Verbal  Cues hand placement;proper use of assistive device;technique;safety     Assistive Device walker, norma;grab bars/safety frame     Comment cues for safety, hand placement. tolerated well        Balance    Static Sitting Balance WFL     Dynamic Sitting Balance mild impairment     Sit to Stand Dynamic Balance mild impairment     Static Standing Balance mild impairment        Functional Reach Test    Trial One: Functional Reach Test (in) 0 inches     Trial Two: Functional Reach Test (in) 0 inches     Average (in) 0 inches        Lower Body Dressing    Tasks don;socks     [place] moderate assist (50-74% patient effort)     Comment decreased RLE strength        Grooming    Self-Performance washes, rinses and dries hands     [place] supervision     Comment cues for RW placement for standing hand washing        Toileting    [place] supervision     Comment seated BM hygiene with s        AM-PAC (TM) - ADL (Current Function)    Putting on and taking off regular lower body clothing? 2 - A Lot     Bathing? 3 - A Little     Toileting? 2 - A Lot     Putting on/taking off regular upper body clothing? 3 - A Little     How much help for taking care of personal grooming? 3 - A Little     Eating meals? 4 - None     AM-PAC (TM) ADL Score 17        Assessment/Plan (OT)    Daily Outcome Statement OT Session complete, pt required extra time due to RLE foot drop. educated on RW use, adaptive ADL strategies, WBAT, compensatory strategies due to RLE weakness. rec ongoing OT, home with assist at d/c               OT Assessment/Plan    Flowsheet Row Most Recent Value   OT Recommended Discharge Disposition home with assistance at 12/27/2022 1531   Anticipated Equipment Needs At Discharge (OT) walker, front-wheeled  [has built-in shower bench, SPC] at 12/27/2022 1531   Patient/Family Therapy Goal Statement To return home at d/c at 12/27/2022 1531                         OT Goals    Flowsheet Row Most Recent Value   Bed Mobility  Goal 1    Activity/Assistive Device bed mobility activities, all at 12/27/2022 1531   [place] modified independence at 12/27/2022 1531   Time Frame 3-5 days at 12/27/2022 1531   Progress/Outcome goal ongoing at 12/27/2022 1531   Transfer Goal 1    Activity/Assistive Device toilet at 12/27/2022 1531   [place] modified independence at 12/27/2022 1531   Time Frame 3-5 days at 12/27/2022 1531   Progress/Outcome goal ongoing at 12/27/2022 1531   Transfer Goal 2    Activity/Assistive Device shower at 12/27/2022 1531   [place] supervision required at 12/27/2022 1531   Time Frame 3-5 days at 12/27/2022 1531   Progress/Outcome goal ongoing at 12/27/2022 1531   Dressing Goal 1    Activity/Adaptive Equipment lower body dressing at 12/27/2022 1531   [place] modified independence at 12/27/2022 1531   Time Frame 3-5 days at 12/27/2022 1531   Strategies/Barriers LH AE edu at 12/27/2022 1531   Progress/Outcome goal ongoing at 12/27/2022 1531

## 2022-12-28 NOTE — PROGRESS NOTES
Orthopaedic Surgery Update Note    Interval motor/sensation check on right lower extremity.    Patient endorses subjective improvement in dorsiflexion and numbness.    Endorses sensation intact to light touch in saphenous, superficial peroneal, deep peroneal, sural, tibial nerve distributions; specifically endorses sensation to dorsum of foot and toes  Weakly fires tibialis anterior and EHL. Fires gastrocsoleus complex  Palpable dorsalis pedis pulse    RLE WBAT in AFO. Continue to maximize time in positions with right hip in extension and knee in flexion    Selvin Camarena MD  Orthopaedic Surgery

## 2022-12-28 NOTE — PROGRESS NOTES
Hospital Medicine Service -  Daily Progress Note       SUBJECTIVE   Interval History: pt seen and examined. Resting in chair. No chest pain/dyspnea. Reports numbness in R foot today -dr. Vee/ortho team aware.      OBJECTIVE      Vital signs in last 24 hours:  Temp:  [36.1 °C (96.9 °F)-37 °C (98.6 °F)] 36.4 °C (97.5 °F)  Heart Rate:  [55-87] 57  Resp:  [12-21] 18  BP: (100-154)/(51-70) 121/58    Intake/Output Summary (Last 24 hours) at 12/28/2022 0903  Last data filed at 12/28/2022 0745  Gross per 24 hour   Intake 541 ml   Output 1150 ml   Net -609 ml       PHYSICAL EXAMINATION      Physical Exam  Constitutional:       Appearance: She is not toxic-appearing or diaphoretic.   Eyes:      General: No scleral icterus.  Cardiovascular:      Rate and Rhythm: Regular rhythm.      Heart sounds:     No friction rub. No gallop.   Pulmonary:      Effort: Pulmonary effort is normal. No respiratory distress.      Breath sounds: No stridor. No wheezing, rhonchi or rales.   Abdominal:      General: Bowel sounds are normal.      Palpations: Abdomen is soft.   Skin:     General: Skin is warm.   Neurological:      Mental Status: She is alert and oriented to person, place, and time.   Psychiatric:         Judgment: Judgment normal.        LINES, CATHETERS, DRAINS, AIRWAYS, AND WOUNDS   Lines, Drains, Airways, Wounds:  Peripheral IV (Adult) 12/27/22 Left;Posterior;Proximal Forearm (Active)   Number of days: 1       Surgical Incision Knee Right (Active)   Number of days: 1       Comments:      LABS / IMAGING / TELE      Labs  Results from last 7 days   Lab Units 12/28/22  0415   WBC K/uL 9.36   HEMOGLOBIN g/dL 9.7*   HEMATOCRIT % 30.3*   PLATELETS K/uL 196     Results from last 7 days   Lab Units 12/28/22  0415   SODIUM mEQ/L 133*   POTASSIUM mEQ/L 3.5*   CHLORIDE mEQ/L 101   CO2 mEQ/L 24   BUN mg/dL 18   CREATININE mg/dL 0.7   CALCIUM mg/dL 8.5*   GLUCOSE mg/dL 112*       IMAGING  X-RAY KNEE RIGHT 1 OR 2 VIEWS    Result Date:  12/27/2022  IMPRESSION: Satisfactory immediate postoperative appearance status post right total knee arthroplasty. COMMENT: Bones: Status post joint replacement. Hardware components appear in satisfactory position. No periprosthetic fractures seen. Joints: No dislocation. Soft tissues: Soft tissue swelling and pockets of subcutaneous emphysema related to recent surgery. CLINICAL HISTORY:   Post-Op Knee Surgery PROCEDURE: 2 views of the right knee COMPARISON: None       ASSESSMENT AND PLAN      Anemia  Assessment & Plan  Recommend trend hgb + monitor  No reports of blood NC or urine    S/P knee surgery  Assessment & Plan  S/p Right total knee arthroplasty  Cont w pain management. rec PT/OT eval and treat. Further management per ortho surgery team + monitor. rec DVT proph per ortho rec.    My colleague, Dr. Herrera from [facility] will be on duty tomorrow at 0700 am for further assistance medically. Please notify her with any medical questions or concerns    GERD (gastroesophageal reflux disease)  Assessment & Plan  Cont w PPI    Hyperlipidemia  Assessment & Plan  Cont w statin    Primary hypertension  Assessment & Plan  Cont w norvasc and monitor.      VTE Prophylaxis Plan: Pharmacological DVT prophylaxis and timing of such per the discretion of the surgeon. Strongly recommend maintain sequential compression device  Code Status: Full Code  Estimated Discharge Date: 12/28/2022     Johan Ayala DO  12/28/2022

## 2022-12-28 NOTE — PROGRESS NOTES
Orthopaedic Surgery Progress Note     12/28/22 3:56 AM     Interval History:   Felicia Puckett (89 y.o. female) is resting comfortably in bed with no acute events overnight.     Vital Signs:   Vitals:    12/27/22 2222   BP: (!) 116/56   Pulse: 78   Resp: 18   Temp: 37 °C (98.6 °F)   SpO2: 97%       Labs:   CBC   CBC Results       12/14/22     1205    WBC 4.25    RBC 3.91    HGB 12.3    HCT 37.6    MCV 96.2    MCH 31.5    MCHC 32.7              Physical Exam:   General:   No Acute Distress   Symmetric Chest Rise Bilaterally with Normal Effort   Mood and affect are appropriate     Musculoskeletal:   Right Lower Extremity   Inspection: Surgical dressing is clean, dry, and intact, appropriate tenderness around the knee, no obvious deformity or erythema  Motor: Fires IP/Q/TA/EHL/GS   Sensory: Intact to light touch in SPN/DPN/Tibial/Saphenous/Sural distributions   Vascular: Capillary refill <2, Toes perfused   ROM: Full, passive and active   Compartments soft and compressible     Assessment and Plan   Felicia Puckett is a 89 y.o. female who is POD# 1 Day Post-Op s/p right total knee arthroplasty    -- POD#: 1 Day Post-Op   -- WBS: WBAT   -- DVT PPX  -- Q2 neurovascular checks by nursing  -- PT OT: recs appreciated  -- [facility] recs appreciated    Length of stay: 0      Trav Cornejo MD   Orthopaedic Surgery PGY-2

## 2022-12-28 NOTE — PROGRESS NOTES
Orthopaedic Surgery Progress Note    Interval History:  Resting comfortably with right knee in flexion with pillow. Interval improvement in dorsiflexion of right ankle but continued parasthesias/numbness to dorsal aspect of foot and toes.    Vital Signs:  Vitals:    12/28/22 0405   BP: (!) 110/54   Pulse: 70   Resp: 18   Temp: 36.8 °C (98.2 °F)   SpO2: 96%     Labs:  CBC Results       12/28/22 12/14/22     0415 1205    WBC 9.36 4.25    RBC 3.08 3.91    HGB 9.7 12.3    HCT 30.3 37.6    MCV 98.4 96.2    MCH 31.5 31.5    MCHC 32.0 32.7     375          BMP Results       12/28/22 12/14/22 0415 1205     135    K 3.5 4.2    Cl 101 102    CO2 24 27    Glucose 112 91    BUN 18 20    Creatinine 0.7 0.8    Calcium 8.5 9.6    Anion Gap 8 6    EGFR >60.0 >60.0          Physical Exam:  General:  Awake, following commands  Symmetric chest rise bilaterally with normal effort    Musculoskeletal:    Right Lower Extremity:  Dressings clean, dry, intact  [place] tibialis anterior approximately 20 degrees dorsiflexion, fires gastrocnemius/soleus and peroneals; unable to fire EHL  Sensation intact to light touch in saphenous, sural, tibial nerve distributions; diminished sensation to dorsal aspect of foot and toes  Palpable dorsalis pedis pulse    Assessment & Plan:  89 y.o. female status post right total knee arthroplasty    - POD: 1 Day Post-Op  - Weight bearing status: WBAT, maintain RLE in flexed position with pillow under knee  - DVT prophylaxis: ASA81 BID x4wks  - Analgesia  - PT/OT  - Continue to monitor motor exam; interval change discussed with Dr. Mariusz Camarena MD  Orthopaedic Surgery

## 2022-12-28 NOTE — PLAN OF CARE
Plan of Care Review  Plan of Care Reviewed With: patient  Progress: improving  Outcome Summary: OOB Ax1 with walker to bedside commode. Room air. Voiding without difficulty. Oxy prn for pain. Pt hopeful for DC today after PT

## 2022-12-28 NOTE — PATIENT CARE CONFERENCE
Care Progression Rounds Note  Date: 12/28/2022  Time: 4:37 PM     Patient Name: Felicia Puckett     Medical Record Number: [id number]   YOB: 1933  Sex: Female      Room/Bed: 5458    Admitting Diagnosis: Primary osteoarthritis of right knee [M17.11]  S/P total knee replacement, right [Z96.651]   Admit Date/Time: 12/27/2022  7:34 AM    Primary Diagnosis: No Principal Problem: There is no principal problem currently on the Problem List. Please update the Problem List and refresh.  Principal Problem: No Principal Problem: There is no principal problem currently on the Problem List. Please update the Problem List and refresh.    GMLOS: pending  Anticipated Discharge Date: 12/29/2022    AM-PAC:  Mobility Score: 19    Discharge Planning:  Current Living Arrangements: home  Anticipated Discharge Disposition: home with assistance    Barriers to Discharge:  Medical issues not resolved    Comments:       Participants:  advanced practice provider, , physical therapy, nursing, social work/services

## 2022-12-28 NOTE — PLAN OF CARE
Plan of Care Review  Plan of Care Reviewed With: patient  Progress: improving  Outcome Summary: OOBx1 with rolling walker.  AFO brace to be on when OOB.  Still has very weak dorsiflexion of R foot.  Pillow to be folded under R knee at all times when lying in bed or legs elevated in chair per Dr. Vee.  Looking forward to working with PT/OT tomorrow with AFO brace in place.  Plan for DC home tomorrow or Friday.

## 2022-12-28 NOTE — PROGRESS NOTES
Orthopaedic Surgery Progress Note     12/28/22 3:57 AM     Interval History:   Felicia Puckett (89 y.o. female) is resting comfortably in bed. She states she has parasthesias in her R toes and is unable to dorsiflexion the foot/toes of the right leg.     Vital Signs:   Vitals:    12/27/22 2222   BP: (!) 116/56   Pulse: 78   Resp: 18   Temp: 37 °C (98.6 °F)   SpO2: 97%       Labs:   CBC   CBC Results       12/14/22     1205    WBC 4.25    RBC 3.91    HGB 12.3    HCT 37.6    MCV 96.2    MCH 31.5    MCHC 32.7              Physical Exam:   General:   No Acute Distress   Symmetric Chest Rise Bilaterally with Normal Effort   Mood and affect are appropriate     Musculoskeletal:   Right Lower Extremity   Inspection: Surgical dressing is clean, dry, and intact, appropriate tenderness around the knee, no obvious deformity or erythema, +ecchymosis around the R lateral leg  Motor: Fires IP/Q/GS, unable to fire TA/EHL  Sensory: Intact to light touch in SPN/DPN/Tibial/Saphenous/Sural distributions, with decreased sensation in the distal aspects of the toes  Vascular: Capillary refill <2, Toes perfused   Compartments soft and compressible     Assessment and Plan   Felicia Puckett is a 89 y.o. female who is POD# 1 Day Post-Op s/p right total knee arthroplasty    -- Patient knee placed in flexed position with pillow under the knee, reports almost immediate return of sensation to the distal toes. Is not firing EHL/TA at this time, and will continue to assess. Dr. Vee called and made aware of patient's change in exam  -- POD#: 1 Day Post-Op   -- WBS: WBAT   -- DVT PPX   -- PT OT: recs appreciated  -- [facility] recs appreciated    Length of stay: 0      Trav Cornejo MD   Orthopaedic Surgery PGY-2

## 2022-12-28 NOTE — PLAN OF CARE
Problem: Adult Inpatient Plan of Care  Goal: Readiness for Transition of Care  Outcome: Progressing  Intervention: Mutually Develop Transition Plan  Flowsheets (Taken 12/28/2022 8335)  Anticipated Discharge Disposition: home with assistance  Equipment Needed After Discharge: walker, front-wheeled  Assistive Device/Animal Currently Used at Home: cane, straight  Transportation Concerns: car, none  Readmission Within the Last 30 Days: no previous admission in last 30 days  Patient/Family Anticipated Services at Transition: none  Patient/Family Anticipates Transition to: home with family  Transportation Anticipated: family or friend will provide  Concerns to be Addressed: discharge planning   Met with patient at bedside. S/P Right TKA. Independent with adls prior to admission. Lives alone. Plans to stay with daughter in a in law suite when discharged. Physical therapy recommendation home with assist. PCP Dr Leela Dewey. Pharmacy [facility] [address]. No discharge plannimng needs identified at this time. Daughter will transport home when discharged.

## 2022-12-29 VITALS
HEART RATE: 78 BPM | DIASTOLIC BLOOD PRESSURE: 68 MMHG | TEMPERATURE: 98.3 F | SYSTOLIC BLOOD PRESSURE: 152 MMHG | RESPIRATION RATE: 18 BRPM | WEIGHT: 126 LBS | BODY MASS INDEX: 23.81 KG/M2 | OXYGEN SATURATION: 97 %

## 2022-12-29 PROCEDURE — 97535 SELF CARE MNGMENT TRAINING: CPT | Mod: GO

## 2022-12-29 PROCEDURE — 12000000 HC ROOM AND CARE MED/SURG

## 2022-12-29 PROCEDURE — 97116 GAIT TRAINING THERAPY: CPT | Mod: GP,CQ

## 2022-12-29 PROCEDURE — 99232 SBSQ HOSP IP/OBS MODERATE 35: CPT | Performed by: HOSPITALIST

## 2022-12-29 PROCEDURE — 63700000 HC SELF-ADMINISTRABLE DRUG: Performed by: NURSE PRACTITIONER

## 2022-12-29 PROCEDURE — 63600000 HC DRUGS/DETAIL CODE: Performed by: NURSE PRACTITIONER

## 2022-12-29 PROCEDURE — 97530 THERAPEUTIC ACTIVITIES: CPT | Mod: GO

## 2022-12-29 RX ADMIN — POLYETHYLENE GLYCOL 3350 17 G: 17 POWDER, FOR SOLUTION ORAL at 09:23

## 2022-12-29 RX ADMIN — PANTOPRAZOLE SODIUM 40 MG: 40 TABLET, DELAYED RELEASE ORAL at 09:23

## 2022-12-29 RX ADMIN — ASPIRIN 81 MG CHEWABLE TABLET 81 MG: 81 TABLET CHEWABLE at 09:23

## 2022-12-29 RX ADMIN — ACETAMINOPHEN 650 MG: 325 TABLET, FILM COATED ORAL at 05:25

## 2022-12-29 RX ADMIN — KETOROLAC TROMETHAMINE 15 MG: 15 INJECTION, SOLUTION INTRAMUSCULAR; INTRAVENOUS at 05:25

## 2022-12-29 RX ADMIN — OXYCODONE HYDROCHLORIDE 5 MG: 5 TABLET ORAL at 15:31

## 2022-12-29 RX ADMIN — OXYCODONE HYDROCHLORIDE 5 MG: 5 TABLET ORAL at 09:23

## 2022-12-29 RX ADMIN — SENNOSIDES AND DOCUSATE SODIUM 1 TABLET: 50; 8.6 TABLET ORAL at 09:23

## 2022-12-29 RX ADMIN — AMLODIPINE BESYLATE 5 MG: 5 TABLET ORAL at 09:23

## 2022-12-29 ASSESSMENT — COGNITIVE AND FUNCTIONAL STATUS - GENERAL
STANDING UP FROM CHAIR USING ARMS: 4 - NONE
HELP NEEDED FOR BATHING: 4 - NONE
WALKING IN HOSPITAL ROOM: 4 - NONE
CLIMB 3 TO 5 STEPS WITH RAILING: 4 - NONE
MOVING TO AND FROM BED TO CHAIR: 4 - NONE
TOILETING: 4 - NONE
HELP NEEDED FOR PERSONAL GROOMING: 4 - NONE
EATING MEALS: 4 - NONE
DRESSING REGULAR UPPER BODY CLOTHING: 4 - NONE
DRESSING REGULAR LOWER BODY CLOTHING: 4 - NONE
AFFECT: WFL

## 2022-12-29 NOTE — NURSING NOTE
Pt complaining of right calf pain. Calf tender to the touch with swelling noted. Surgical resident Hermilo Camarena made aware.

## 2022-12-29 NOTE — ASSESSMENT & PLAN NOTE
S/p Right total knee arthroplasty  Cont w pain management. rec PT/OT eval and treat. Further management per ortho surgery team + monitor. rec DVT proph per ortho rec.    Further management of pt's R foot numbness/issue post op per dr. Vee.

## 2022-12-29 NOTE — PROGRESS NOTES
Hospital Medicine Service -  Daily Progress Note       SUBJECTIVE   Interval History:pt seen and examined. Resting in chair. No chest pain/dyspnea. No n/v/abd pain.  No fever/chills. Pt continues to have numbness in R foot.      OBJECTIVE      Vital signs in last 24 hours:  Temp:  [36.8 °C (98.3 °F)-37.2 °C (98.9 °F)] 36.8 °C (98.3 °F)  Heart Rate:  [57-71] 71  Resp:  [18-20] 18  BP: (117-125)/(56-75) 125/75  No intake or output data in the 24 hours ending 12/29/22 0939    PHYSICAL EXAMINATION      Physical Exam  Constitutional:       Appearance: She is not ill-appearing, toxic-appearing or diaphoretic.   Eyes:      General: No scleral icterus.  Cardiovascular:      Rate and Rhythm: Regular rhythm.      Heart sounds:     No friction rub. No gallop.   Pulmonary:      Effort: No respiratory distress.      Breath sounds: No stridor. No wheezing, rhonchi or rales.   Abdominal:      General: Bowel sounds are normal.      Palpations: Abdomen is soft.   Skin:     General: Skin is warm.   Neurological:      General: No focal deficit present.      Mental Status: She is alert and oriented to person, place, and time.   Psychiatric:         Judgment: Judgment normal.        LINES, CATHETERS, DRAINS, AIRWAYS, AND WOUNDS   Lines, Drains, Airways, Wounds:  Peripheral IV (Adult) 12/27/22 Left;Posterior;Proximal Forearm (Active)   Number of days: 2       Surgical Incision Knee Right (Active)   Number of days: 2       Comments:      LABS / IMAGING / TELE      Labs  Results from last 7 days   Lab Units 12/28/22  0415   WBC K/uL 9.36   HEMOGLOBIN g/dL 9.7*   HEMATOCRIT % 30.3*   PLATELETS K/uL 196     Results from last 7 days   Lab Units 12/28/22  0415   SODIUM mEQ/L 133*   POTASSIUM mEQ/L 3.5*   CHLORIDE mEQ/L 101   CO2 mEQ/L 24   BUN mg/dL 18   CREATININE mg/dL 0.7   CALCIUM mg/dL 8.5*   GLUCOSE mg/dL 112*       IMAGING  X-RAY KNEE RIGHT 1 OR 2 VIEWS    Result Date: 12/27/2022  IMPRESSION: Satisfactory immediate postoperative  appearance status post right total knee arthroplasty. COMMENT: Bones: Status post joint replacement. Hardware components appear in satisfactory position. No periprosthetic fractures seen. Joints: No dislocation. Soft tissues: Soft tissue swelling and pockets of subcutaneous emphysema related to recent surgery. CLINICAL HISTORY:   Post-Op Knee Surgery PROCEDURE: 2 views of the right knee COMPARISON: None       ASSESSMENT AND PLAN      Anemia  Assessment & Plan  Recommend trend hgb + monitor  Pt hemodynamically stable.   No reports of blood AL or urine    S/P knee surgery  Assessment & Plan  S/p Right total knee arthroplasty  Cont w pain management. rec PT/OT eval and treat. Further management per ortho surgery team + monitor. rec DVT proph per ortho rec.    Further management of pt's R foot numbness/issue post op per dr. Vee.         GERD (gastroesophageal reflux disease)  Assessment & Plan  Cont w PPI    Hyperlipidemia  Assessment & Plan  Cont w statin    Primary hypertension  Assessment & Plan  Cont w norvasc and monitor.        VTE Prophylaxis Plan: Pharmacological DVT prophylaxis and timing of such per the discretion of the surgeon. Strongly recommend maintain sequential compression device  Code Status: Full Code  Estimated Discharge Date: 12/29/2022     Johan Ayala DO  12/29/2022

## 2022-12-29 NOTE — PROGRESS NOTES
Orthopaedic Surgery Progress Note    Interval History:  Resting comfortably with right knee in flexion with pillow. No acute events overnight.    Vital Signs:  Vitals:    12/29/22 0738   BP: 125/75   Pulse: 71   Resp: 18   Temp: 36.8 °C (98.3 °F)   SpO2: 96%     Physical Exam:  General:  Awake, following commands  Symmetric chest rise bilaterally with normal effort    Musculoskeletal:    Right Lower Extremity:  Dressings clean, dry, intact  Fires EHL and GSC, does not fire TA  Sensation intact to light touch in saphenous, sural, tibial nerve distributions; patchy sensation superficial peroneal nerve, absent sensation deep peroneal nerve  Palpable dorsalis pedis pulse    Assessment & Plan:  89 y.o. female status post right total knee arthroplasty    - POD: 2 Days Post-Op  - Weight bearing status: WBAT, AFO RLE when walking, maintain RLE in flexed position when resting  - DVT prophylaxis: ASA81 BID x4wks  - Analgesia  - PT/OT  - DC today after working with PT with AFO    Selvin Camarena MD  Orthopaedic Surgery

## 2022-12-29 NOTE — PLAN OF CARE
Plan of Care Review  Plan of Care Reviewed With: patient  Progress: improving  Outcome Summary: OOB Ax1 with walker. Voiding without difficulty. Room air. Mepilex cdi. Pt still unable to dorsiflex R foot. Pt to have folded pillow under R knee while in bed at all times. AFO brace while OOB. Pt will work with PT today. Pt wants to stay another day and then hopes to leave Friday

## 2022-12-29 NOTE — UM PHYSICIAN REVIEW NOTE
89 yo here for right total knee arthroplasty now with new foot drop which was a possible complication of surgery. Care has crossed 2MN and inpatient status is appropriate.     Jonathan Hennnig MD

## 2022-12-29 NOTE — PATIENT CARE CONFERENCE
Care Progression Rounds Note  Date: 12/29/2022  Time: 2:56 PM     Patient Name: Felicia Puckett     Medical Record Number: [id number]   YOB: 1933  Sex: Female      Room/Bed: 5458    Admitting Diagnosis: Primary osteoarthritis of right knee [M17.11]  S/P total knee replacement, right [Z96.651]  S/P knee surgery [Z98.890]   Admit Date/Time: 12/27/2022  7:34 AM    Primary Diagnosis: S/P knee surgery  Principal Problem: S/P knee surgery    GMLOS: pending  Anticipated Discharge Date: 12/30/2022    AM-PAC:  Mobility Score: 19    Discharge Planning:  Current Living Arrangements: home  Concerns to be Addressed: discharge planning  Anticipated Discharge Disposition: home with assistance    Barriers to Discharge:  Medical issues not resolved    Comments:       Participants:  advanced practice provider, , physical therapy, nursing, social work/services

## 2022-12-29 NOTE — PROGRESS NOTES
Patient: Felicia Puckett  Location:  [facility] 5PAV 5458  MRN:  [id number]  Today's date:  12/29/2022    General Observations    Start:    Pt received reclined in chair; Felicia was agreeable to therapy    End: Pt reclined in chair at end of session; call bell in reach, chair alarm activated and personal items in reach.    Felicia is a 89 y.o. female admitted on 12/27/2022 with Primary osteoarthritis of right knee [M17.11]  S/P total knee replacement, right [Z96.651]. Principal problem is No Principal Problem: There is no principal problem currently on the Problem List. Please update the Problem List and refresh..    Past Medical History  Felicia has a past medical history of COVID (11/18/2022), COVID-19 vaccine series completed, GERD (gastroesophageal reflux disease), Hypertension, and Lipid disorder.    History of Present Illness   89 y.o. female s/p right total knee arthroplasty 12/27/22.        PT Vitals    Date/Time Pulse HR Source SpO2 Pt Activity O2 Therapy BP BP Location BP Method Pt Position [facility]   12/29/22 1305 78 Monitor 97 % At rest None (Room air) 152/68 Right upper arm Automatic Lying [facility]   12/29/22 1306 78 -- 97 % -- -- 152/68 -- -- -- [facility]      PT Pain    Date/Time Pain Type Side/Orientation Location Rating: Rest Rating: Activity Interventions [facility]   12/29/22 1305 -- right leg and lateral calf 2 5 position adjusted [facility]   12/29/22 1306 Pain Assessment right leg 2 5 position adjusted [facility]          Prior Living Environment    Flowsheet Row Most Recent Value   Current Living Arrangements home   Living Environment Comment lives alone in [facility] but will be going to [facility] in an in-law suite, [address], first floor set up, walk in shower        Prior Level of Function    Flowsheet Row Most Recent Value   Dominant Hand right   Ambulation assistive equipment   Transferring assistive equipment   Toileting independent   Bathing independent   Dressing independent   Eating independent   Communication  understands/communicates without difficulty   Prior Level of Function Comment IND c SPC, works full time as a  traveling, (+)    Assistive Device Currently Used at Home cane, straight           PT Evaluation and Treatment - 12/29/22 1305        PT Time Calculation    Start Time 1305     Stop Time 1348     Time Calculation (min) 43 min        Session Details    Document Type daily treatment/progress note     Mode of Treatment physical therapy        General Information    Patient Profile Reviewed yes     General Observations of Patient Pt rec'd at bedside, cleared for therapy by RN     Existing Precautions/Restrictions fall;weight bearing        Weight-bearing Status    Right LE Weight-Bearing Status weight-bearing as tolerated (WBAT)        Cognition/Psychosocial    Affect/Mental Status (Cognition) WFL     Orientation Status (Cognition) oriented x 4     Follows Commands (Cognition) WFL     Cognitive Function WFL        Sensory    Hearing Status hearing impairment, bilaterally        Vision Assessment/Intervention    Visual Impairment/Limitations corrective lenses full-time        Range of Motion (ROM)    Right Lower Extremity (ROM) knee     Knee, Right (ROM) AROM 10-95*        Bed Mobility    Comment (Bed Mobility) Pt rec'd OOB        Transfers    Transfers car transfer     Maintains Weight-Bearing Status (Transfers) able to maintain        Sit to Stand Transfer    [place], Sit to Stand Transfer modified independence     Assistive Device walker, front-wheeled     Comment AFO donned with surgial shoe        Stand to Sit Transfer    [place], Stand to Sit Transfer modified independence     Assistive Device walker, front-wheeled        Car Transfer    Transfer Technique sit-stand;stand-sit     [place], Car Transfer modified independence     Assistive Device walker, front-wheeled        Gait Training    [place], Gait distant supervision     Assistive Device walker, front-wheeled      Distance in Feet 310 feet     Pattern (Gait) step-to;step-through     Deviations/Abnormal Patterns (Gait) antalgic;gait speed decreased;step length decreased;stride length decreased;weight shifting decreased     Maintains Weight-bearing Status (Gait) able to maintain     Advanced Gait Activity curb negotiation;10 Meter Walk Test (Self-Selected Velocity)     Comment (Gait/Stairs) 1 instance of LOB to the L side, pt able to self correct. No other LOB's. VC's for step throught leslee kessler'fer time for pt to advance to step throught technique. Much faster amb speed this session than last session.        Curb Negotiation    [place] distant supervision     Assistive Device walker, front-wheeled     Comment Supervision for safety        10 Meter Walk Test (Self-Selected Velocity)    Trial One: Ten Meter Walk Test (sec) 15 seconds     Trial Two: Ten Meter Walk Test (sec) 15 seconds     Trial One: Gait Speed (m/s) 0.4 m/s     Trial Two: Gait Speed (m/s) 0.4 m/s     Average Gait Speed (m/s): Two Trials 0.4 m/s        Stairs Training    [place], Stairs distant supervision     Assistive Device railing;cane, straight     Handrail Location (Stairs) left side (ascending)     Number of Stairs 4     Ascending Stairs Technique step-to-step     Descending Stairs Technique step-to-step     Maintains Weight-bearing Status (Stairs) able to maintain     Comment Pt req'd cues for management of assistive device and cues for sequencing for BLE's. Recommending Supervision on stairs once d/c'd. Pt's family not present for treatment. Pt educated on how family can provide assistance / supervision for safety. Verbalized understanding of techniques educated.        Balance    Static Sitting Balance WFL     Dynamic Sitting Balance WFL     Sit to Stand Dynamic Balance WFL     Static Standing Balance WFL     Dynamic Standing Balance WFL     Comment, Balance 1 Instance of LOB to the L side, recommending supervision for mobility. Pt able to  self correct LOB.        Therapeutic Exercise    Therapeutic Exercise lower extremity        Lower Extremity (Therapeutic Exercise)    Exercise Position/Type seated     General Exercise bilateral;heel slides;LAQ (long arc quad);marching while seated;SLR (straight leg raise)     Reps and Sets x10     Comment Pt educated on TE's to perform to increase functional ROM and strength of BLE's.        AM-PAC (TM) - Mobility (Current Function)    Turning from your back to your side while in a flat bed without using bedrails? 4 - None     Moving from lying on your back to sitting on the side of a flat bed without using bedrails? 4 - None     Moving to and from a bed to a chair? 4 - None     Standing up from a chair using your arms? 4 - None     To walk in a hospital room? 4 - None     Climbing 3-5 steps with a railing? 4 - None     AM-PAC (TM) Mobility Score 24        Assessment/Plan (PT)    Daily Outcome Statement Ax1 supervision for mobility. Training conducted with negotiation of safe functional mobility techniques which included gait w/ RW and transfer techniques. Pt. demo good carryover with instructions. Also educated in application of surgical precautions with daily activities/mobility. Educated on TE's to assist with recovery following surgery to promote healing and strengthening of BLE's. Pt's family not present for treatment. Pt educated on how family can provide assistance / supervision for safety. Pt cleared PT for home with supervision. Recommending supervision due to pt having 1 instance of LOB to the L side, but able to self correct safely not requringing outside assistance from therapy. Will have daughter at home with her for supervision. Pt able to greatly improve amb with AFO + surgical shoe for better amb speed and stability.               PT Assessment/Plan    Flowsheet Row Most Recent Value   PT Recommended Discharge Disposition home with assistance at 12/27/2022 1532   Anticipated Equipment Needs at  Discharge (PT) walker, front-wheeled at 12/29/2022 1305 RW issued- pt. verbalizes understanding of proper care/maintenance and use of RW after demonstration/instruction.      Patient/Family Therapy Goals Statement to go home at 12/27/2022 1532               Equipment Provided: Walker, with Wheels, Adult   Vendor: [employer]  RW issued- pt. verbalizes understanding of proper care/maintenance and use of RW after demonstration/instruction.     PT Goals    Flowsheet Row Most Recent Value   Bed Mobility Goal 1    Activity/Assistive Device bed mobility activities, all at 12/27/2022 1532   [place] modified independence at 12/27/2022 1532   Time Frame by discharge at 12/27/2022 1532   Progress/Outcome goal ongoing at 12/27/2022 1532   Transfer Goal 1    Activity/Assistive Device all transfers at 12/27/2022 1532   [place] modified independence at 12/27/2022 1532   Time Frame by discharge at 12/27/2022 1532   Progress/Outcome goal ongoing at 12/27/2022 1532   Gait Training Goal 1    Activity/Assistive Device gait (walking locomotion), assistive device use at 12/27/2022 1532   [place] modified independence at 12/27/2022 1532   Distance 200 at 12/27/2022 1532   Time Frame by discharge at 12/27/2022 1532   Progress/Outcome goal ongoing at 12/27/2022 1532   ROM Goal 1    ROM Goal 1 R knee 0-90 at 12/27/2022 1532   Time Frame by discharge at 12/27/2022 1532   Progress/Outcome goal ongoing at 12/27/2022 1532   Stairs Goal 1    Activity/Assistive Device stairs, all skills, assistive device use at 12/27/2022 1532   [place] modified independence at 12/27/2022 1532   Number of Stairs 1 at 12/27/2022 1532   Time Frame by discharge at 12/27/2022 1532   Progress/Outcome goal ongoing at 12/27/2022 1532

## 2022-12-29 NOTE — DISCHARGE SUMMARY
Inpatient Discharge Summary    Pt is a 89 y.o. female with a history of osteoarthritis who presented with symptoms recalcitrant to non-surgical management. Dr. Vee recommended Procedure(s):  Right Total Knee Arthroplasty .     The patient underwent the procedure on 12/27/2022. Patient tolerated the procedure well. 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 The patient was seen by the orthopaedic and hospital medicine service and progressed to the point that on the day of discharge they were voiding without difficulty, tolerating a house diet, and were comfortable with all post-operative activity restrictions.     At discharge the patient's vital signs were stable and the surgical incision site was clean, dry and intact.     The patient was discharged on  and told to continue pain medications and bowel regimen as needed, resume home medications except for those marked as held on discharge instructions, and to follow up with their surgeon. The patient was advised to call the office with any problems, including drainage from the incision, redness around the incision, worsening pain, fever greater than 101 degrees F, new numbness, weakness, or tingling in the extremities, numbness of the genital or saddle region, or incontinence of bowel or bladder.     Selvin Camarena MD

## 2022-12-29 NOTE — PROGRESS NOTES
Patient:  Felicia Puckett  Location:  [facility] 5PAV [id number]  MRN:  [id number]  Today's date:  12/29/2022     General Observations  Start: Pt received seated in chair; she was agreeable to therapy and no issues or concerns identified by nurse prior to session   End: Pt seated in chair at end of session; call bell in reach, chair alarm activated, all needs met, personal items in reach and nurse notified of patient performance, patient's position, patient's response to activity and change in vital signs       Felicia is a 89 y.o. female admitted on 12/27/2022 with Primary osteoarthritis of right knee [M17.11]  S/P total knee replacement, right [Z96.651]. Principal problem is No Principal Problem: There is no principal problem currently on the Problem List. Please update the Problem List and refresh..    Past Medical History  Felicia has a past medical history of COVID (11/18/2022), COVID-19 vaccine series completed, GERD (gastroesophageal reflux disease), Hypertension, and Lipid disorder.    History of Present Illness   89 y.o. female s/p right total knee arthroplasty 12/27/22.        OT Vitals    Date/Time Pulse SpO2 BP [facility]   12/29/22 1306 78 97 % 152/68 [facility]      OT Pain    Date/Time Pain Type Side/Orientation Location Rating: Rest Rating: Activity Interventions [facility]   12/29/22 1306 Pain Assessment right leg 2 5 position adjusted [facility]          Prior Living Environment    Flowsheet Row Most Recent Value   Current Living Arrangements home   Living Environment [place] [facility] going [facility] in an in-law suite, [address], first floor set up, walk in shower        Prior Level of Function    Flowsheet Row Most Recent Value   Dominant Hand right   Ambulation assistive equipment   Transferring assistive equipment   Toileting independent   Bathing independent   Dressing independent   Eating independent   Communication understands/communicates without difficulty   Prior Level of Function Comment IND c SPC,  works full time as a  traveling, (+)    Assistive Device Currently Used at Home canashok millard        Occupational Profile    Flowsheet Row Most Recent Value   Reason for Services/Referral s/p R TKA   Successful Occupations works as    Environmental Supports and Barriers From home alone normally but plans to d/c dtr's house first.           OT Evaluation and Treatment - 12/29/22 1306        OT Time Calculation    Start Time 1306     Stop Time 1346     Time Calculation (min) 40 min        Session Details    Document Type daily treatment/progress note     Mode of Treatment occupational therapy        General Information    Patient Profile Reviewed yes     Onset of Illness/Injury or Date of Surgery 12/27/22     Referring Physician Mariusz     Patient/Family/Caregiver Comments/Observations Pt in chair, agreeable to OT, RLE with pillow under knee per MD     General Observations of Patient RN cleared for OT, at end of session, orthotic rep present to make final adjustment to AFO     Existing Precautions/Restrictions fall;weight bearing        Weight-bearing Status    Right LE Weight-Bearing Status weight-bearing as tolerated (WBAT)   in AFO, had to use surgical shoe due to swelling and edema in R Foot       Cognition/Psychosocial    Orientation Status (Cognition) oriented x 4     Follows Commands (Cognition) WFL     Cognitive Function WFL        Bed Mobility    [place], Supine to Sit independent     [place], Sit to Supine independent     Comment (Bed Mobility) flat bed independent        Transfers    Transfers toilet transfer;shower transfer        Sit to Stand Transfer    [place], Sit to Stand Transfer modified independence     Assistive Device walker, front-wheeled     Comment AFO in place with Surgical shoe. mod I with RW        Stand to Sit Transfer    [place], Stand to Sit Transfer modified independence     Assistive Device walker, front-wheeled        Toilet Transfer     Transfer Technique sit-stand;stand-sit     [place], Toilet Transfer modified independence     Comment mod I with grab bar and RW        Shower Transfer    Transfer Technique step over, left entry;step over, right entry     [place], Shower Transfer independent     Comment pt reports she has a 0 threashold shower        Balance    Static Sitting Balance WFL     Dynamic Sitting Balance WFL     Sit to Stand Dynamic Balance WFL     Static Standing Balance WFL     Dynamic Standing Balance WFL        Functional Reach Test    Trial One: Functional Reach Test (in) 10 inches     Trial Two: Functional Reach Test (in) 11 inches     Average (in) 10.5 inches        Bathing    Comment provided patient with education on adaptive strategies for bathing like use of long handle sponge and use of bath bench, pt agreeable        Upper Body Dressing    Tasks don;pull over garment     [place] independent     Comment pt reports independence this AM        Lower Body Dressing    Tasks pants/bottoms;underwear;socks;shoes/slippers     Laura Assistance adaptive equipment setup     Comment independent for LB Dressing, donning AFO after education. pt reports independence for underwear and pants        Toileting    Comment pt reports independence        AM-PAC (TM) - ADL (Current Function)    Putting on and taking off regular lower body clothing? 4 - None     Bathing? 4 - None     Toileting? 4 - None     Putting on/taking off regular upper body clothing? 4 - None     How much help for taking care of personal grooming? 4 - None     Eating meals? 4 - None     AM-PAC (TM) ADL Score 24        Assessment/Plan (OT)    Daily Outcome Statement Bedside OT session complete, pt completed transfers with Mod I, WBAT in RLE AFO intact. independent for ADLs. rec home with assist at d/c, d/c OT               OT Assessment/Plan    Flowsheet Row Most Recent Value   OT Recommended Discharge Disposition home with assistance at 12/27/2022 3335    Anticipated Equipment Needs At Discharge (OT) walker, front-wheeled  [has built-in shower bench, SPC] at 12/27/2022 1531   Patient/Family Therapy Goal Statement To return home at d/c at 12/27/2022 1531                         OT Goals    Flowsheet Row Most Recent Value   Bed Mobility Goal 1    Activity/Assistive Device bed mobility activities, all at 12/27/2022 1531   [place] modified independence at 12/27/2022 1531   Time Frame 3-5 days at 12/27/2022 1531   Progress/Outcome goal met at 12/29/2022 1306   Transfer Goal 1    Activity/Assistive Device toilet at 12/27/2022 1531   [place] modified independence at 12/27/2022 1531   Time Frame 3-5 days at 12/27/2022 1531   Progress/Outcome goal met at 12/29/2022 1306   Transfer Goal 2    Activity/Assistive Device shower at 12/27/2022 1531   [place] supervision required at 12/27/2022 1531   Time Frame 3-5 days at 12/27/2022 1531   Progress/Outcome goal met at 12/29/2022 1306   Dressing Goal 1    Activity/Adaptive Equipment lower body dressing at 12/27/2022 1531   [place] modified independence at 12/27/2022 1531   Time Frame 3-5 days at 12/27/2022 1531   Strategies/Barriers LH AE edu at 12/27/2022 1531   Progress/Outcome goal met at 12/29/2022 1306

## 2022-12-29 NOTE — NURSING NOTE
Discharge instructions given and reviewed with patient. Patient verbalized understanding. All questions answered. Patient ready for safe discharge to home.

## 2023-03-06 ENCOUNTER — TRANSCRIBE ORDERS (OUTPATIENT)
Dept: SCHEDULING | Age: 88
End: 2023-03-06

## 2023-03-06 DIAGNOSIS — M54.16 RADICULOPATHY, LUMBAR REGION: Primary | ICD-10-CM

## 2023-03-06 DIAGNOSIS — M48.061 SPINAL STENOSIS, LUMBAR REGION WITHOUT NEUROGENIC CLAUDICATION: ICD-10-CM

## 2023-03-14 ENCOUNTER — HOSPITAL ENCOUNTER (OUTPATIENT)
Dept: RADIOLOGY | Facility: HOSPITAL | Age: 88
Discharge: HOME | End: 2023-03-14
Attending: ORTHOPAEDIC SURGERY
Payer: MEDICARE

## 2023-03-14 DIAGNOSIS — M48.061 SPINAL STENOSIS, LUMBAR REGION WITHOUT NEUROGENIC CLAUDICATION: ICD-10-CM

## 2023-03-14 DIAGNOSIS — M54.16 RADICULOPATHY, LUMBAR REGION: ICD-10-CM

## 2023-03-14 PROCEDURE — 72148 MRI LUMBAR SPINE W/O DYE: CPT

## 2023-05-01 ENCOUNTER — TRANSCRIBE ORDERS (OUTPATIENT)
Dept: SCHEDULING | Age: 88
End: 2023-05-01

## 2023-05-01 DIAGNOSIS — Z96.651 PRESENCE OF RIGHT ARTIFICIAL KNEE JOINT: Primary | ICD-10-CM

## 2023-05-11 ENCOUNTER — HOSPITAL ENCOUNTER (OUTPATIENT)
Dept: RADIOLOGY | Facility: HOSPITAL | Age: 88
Discharge: HOME | End: 2023-05-11
Attending: ORTHOPAEDIC SURGERY
Payer: MEDICARE

## 2023-05-11 DIAGNOSIS — Z96.651 PRESENCE OF RIGHT ARTIFICIAL KNEE JOINT: ICD-10-CM

## 2023-05-11 PROCEDURE — 73721 MRI JNT OF LWR EXTRE W/O DYE: CPT | Mod: RT

## 2023-06-14 ENCOUNTER — TRANSCRIBE ORDERS (OUTPATIENT)
Dept: SCHEDULING | Age: 88
End: 2023-06-14

## 2023-06-14 DIAGNOSIS — Z96.651 PRESENCE OF RIGHT ARTIFICIAL KNEE JOINT: Primary | ICD-10-CM

## 2023-07-10 ENCOUNTER — HOSPITAL ENCOUNTER (OUTPATIENT)
Dept: RADIOLOGY | Facility: HOSPITAL | Age: 88
Discharge: HOME | End: 2023-07-10
Attending: ORTHOPAEDIC SURGERY
Payer: MEDICARE

## 2023-07-10 DIAGNOSIS — Z96.651 PRESENCE OF RIGHT ARTIFICIAL KNEE JOINT: ICD-10-CM

## 2023-07-10 PROCEDURE — 73721 MRI JNT OF LWR EXTRE W/O DYE: CPT | Mod: RT

## 2024-05-20 ENCOUNTER — OFFICE VISIT (OUTPATIENT)
Dept: SURGERY | Facility: CLINIC | Age: 89
End: 2024-05-20
Payer: MEDICARE

## 2024-05-20 VITALS
HEART RATE: 86 BPM | SYSTOLIC BLOOD PRESSURE: 138 MMHG | OXYGEN SATURATION: 98 % | HEIGHT: 62 IN | BODY MASS INDEX: 23.74 KG/M2 | TEMPERATURE: 98.1 F | WEIGHT: 129 LBS | DIASTOLIC BLOOD PRESSURE: 68 MMHG

## 2024-05-20 DIAGNOSIS — L72.3 INFECTED SEBACEOUS CYST OF SKIN: Primary | ICD-10-CM

## 2024-05-20 DIAGNOSIS — L08.9 INFECTED SEBACEOUS CYST OF SKIN: Primary | ICD-10-CM

## 2024-05-20 PROCEDURE — 99203 OFFICE O/P NEW LOW 30 MIN: CPT | Performed by: SURGERY

## 2024-05-20 RX ORDER — LEVOFLOXACIN 500 MG/1
500 TABLET, FILM COATED ORAL DAILY
Qty: 7 TABLET | Refills: 0 | Status: SHIPPED | OUTPATIENT
Start: 2024-05-20 | End: 2024-05-27

## 2024-05-20 ASSESSMENT — PAIN SCALES - GENERAL: PAINLEVEL: 0-NO PAIN

## 2024-05-20 NOTE — LETTER
May 20, 2024     Rio Barajas Jr., MD  [address]  [place] [address]    Patient: Felicia Puckett  YOB: 1933  Date of Visit: 2024      Dear Dr. Barajas:    Thank you for referring Felicia Puckett to me for evaluation. Below are my notes for this consultation.    If you have questions, please do not hesitate to call me. I look forward to following your patient along with you.         Sincerely,        Nixon Zhang MD        CC: MD Leatha Arreola Charles A, MD  2024 12:26 PM  Sign when Signing Visit  Patient ID: Felicia Puckett                              : 10/22/1933  MRN: [id number]                                            Visit Date: 2024  Encounter Provider: Nixon Zhang  Referring Provider: Rio Barajas Jr., MD    Subjective  Chief Complaint: Chest wall abscess    HPI: Felicia Puckett is a 89 y.o. old female   with a past medical history as noted below who developed a painful red area on her chest last week and began having spontaneous drainage on 2024.  First she noted some pus and later with sounds like some sebaceous material and soon thereafter she noted a small amount of blood.  She denies any fevers or chills.  On further questioning there was a small antecedent mass present..      Medications:   Current Outpatient Medications:   •  amLODIPine (NORVASC) 5 mg tablet, Take 5 mg by mouth daily. am, Disp: , Rfl:   •  cholecalciferol, vitamin D3, 1,000 unit (25 mcg) tablet, Take 1,000 Units by mouth daily., Disp: , Rfl:   •  coenzyme Q10 (COQ10) 100 mg capsule, Take 1 capsule (100 mg total) by mouth daily. Hold until cleared by Dr. Vee., Disp: 30 capsule, Rfl: 0  •  hydrochlorothiazide (MICROZIDE) 12.5 mg capsule, Take 12.5 mg by mouth daily. am, Disp: , Rfl:   •  ibuprofen (MOTRIN) 600 mg tablet, Take 600 mg by mouth every 6 (six) hours as needed. Pt usually takes 1 time a day., Disp: , Rfl:   •  lansoprazole (PREVACID) 30 mg capsule, Take  30 mg by mouth daily before breakfast. am, Disp: , Rfl:   •  pravastatin (PRAVACHOL) 20 mg tablet, Take 20 mg by mouth daily. pm, Disp: , Rfl:   •  acetaminophen (TYLENOL) 500 mg tablet, Take 500 mg by mouth every 6 (six) hours as needed., Disp: , Rfl:   •  aspirin 81 mg chewable tablet, Take 1 tablet (81 mg total) by mouth 2 (two) times a day. For 4 weeks to prevent blood clots. (Patient not taking: Reported on 5/20/2024), Disp: 60 tablet, Rfl: 0  •  esterified estrogens (MENEST) 0.3 mg tablet, Take 1 tablet (0.3 mg total) by mouth daily. Hold until cleared by Dr. Vee. (Patient not taking: Reported on 5/20/2024), Disp: 30 tablet, Rfl: 0  •  polyethylene glycol (MIRALAX) 17 gram packet, Take 17 g by mouth daily as needed (constipation) for up to 3 days. For constipation associated with narcotics; hold for loose stools. (Patient not taking: Reported on 5/20/2024), Disp: , Rfl:   •  sennosides-docusate sodium (SENNA WITH DOCUSATE SODIUM) 8.6-50 mg, Take 1 tablet by mouth 2 (two) times a day. For constipation associated with narcotics; hold for loose stools. (Patient not taking: Reported on 5/20/2024), Disp: 60 tablet, Rfl: 0    Past Medical History:  has a past medical history of COVID (11/18/2022), COVID-19 vaccine series completed, GERD (gastroesophageal reflux disease), Hypertension, and Lipid disorder.  Past Surgical History:  has a past surgical history that includes Finger surgery; Bladder surgery; Hysterectomy; and Replacement total knee (Right, 2023).  Social History:   Social History     Tobacco Use   • Smoking status: Never   • Smokeless tobacco: Never   Vaping Use   • Vaping Use: Never used   Substance Use Topics   • Alcohol use: Not Currently   • Drug use: Never      Family History: family history includes Hypertension in her biological mother.   Allergies: is allergic to penicillins.     Review of Systems: A complete  review of systems is negative except as noted above  The following have been  "reviewed and updated as appropriate in this visit:          Objective  Vitals: Visit Vitals  /68 (BP Location: Left upper arm, Patient Position: Sitting)   Pulse 86   Temp 36.7 °C (98.1 °F) (Temporal)   Ht 1.562 m (5' 1.5\")   Wt 58.5 kg (129 lb)   SpO2 98%   BMI 23.98 kg/m²     Physical Exam    Heart: Regular rate and rhythm with no murmurs, rubs, or gallops.  Normal S1, S2  Lungs: Clear to auscultation bilaterally with no wheezes, rales, or rhonchi.  Nonlabored respirations.  Skin: At the top of her cleavage there is a small pea-sized palpable mass present which does not appear to be fixed to underlying structures.  There is some discoloration noted but no erythema or evidence of infection.         Assessment: Subcutaneous mass chest, likely sebaceous cyst, rule out other    Plan: Felicia was treated with an empiric course of oral Levaquin.  She had already started an antibiotic today because she does have a knee in place and was told to do so if she was having any procedures done.  She will apply warm soaks to this area 3 times daily.  She does understand that she should have this area excised and is aware of the indications, risk, complications, and alternatives to doing so, including doing nothing.    Nixon Zhang MD  "

## 2024-05-20 NOTE — PROGRESS NOTES
Patient ID: Felicia Puckett                              : 10/22/1933  MRN: [id number]                                            Visit Date: 2024  Encounter Provider: Nixon Zhang  Referring Provider: Rio Barajas Jr., MD    Subjective   Chief Complaint: Chest wall abscess    HPI: Felicia Puckett is a 89 y.o. old female   with a past medical history as noted below who developed a painful red area on her chest last week and began having spontaneous drainage on 2024.  First she noted some pus and later with sounds like some sebaceous material and soon thereafter she noted a small amount of blood.  She denies any fevers or chills.  On further questioning there was a small antecedent mass present..      Medications:   Current Outpatient Medications:     amLODIPine (NORVASC) 5 mg tablet, Take 5 mg by mouth daily. am, Disp: , Rfl:     cholecalciferol, vitamin D3, 1,000 unit (25 mcg) tablet, Take 1,000 Units by mouth daily., Disp: , Rfl:     coenzyme Q10 (COQ10) 100 mg capsule, Take 1 capsule (100 mg total) by mouth daily. Hold until cleared by Dr. Vee., Disp: 30 capsule, Rfl: 0    hydrochlorothiazide (MICROZIDE) 12.5 mg capsule, Take 12.5 mg by mouth daily. am, Disp: , Rfl:     ibuprofen (MOTRIN) 600 mg tablet, Take 600 mg by mouth every 6 (six) hours as needed. Pt usually takes 1 time a day., Disp: , Rfl:     lansoprazole (PREVACID) 30 mg capsule, Take 30 mg by mouth daily before breakfast. am, Disp: , Rfl:     pravastatin (PRAVACHOL) 20 mg tablet, Take 20 mg by mouth daily. pm, Disp: , Rfl:     acetaminophen (TYLENOL) 500 mg tablet, Take 500 mg by mouth every 6 (six) hours as needed., Disp: , Rfl:     aspirin 81 mg chewable tablet, Take 1 tablet (81 mg total) by mouth 2 (two) times a day. For 4 weeks to prevent blood clots. (Patient not taking: Reported on 2024), Disp: 60 tablet, Rfl: 0    esterified estrogens (MENEST) 0.3 mg tablet, Take 1 tablet (0.3 mg total) by mouth daily. Hold until  "cleared by Dr. Vee. (Patient not taking: Reported on 5/20/2024), Disp: 30 tablet, Rfl: 0    polyethylene glycol (MIRALAX) 17 gram packet, Take 17 g by mouth daily as needed (constipation) for up to 3 days. For constipation associated with narcotics; hold for loose stools. (Patient not taking: Reported on 5/20/2024), Disp: , Rfl:     sennosides-docusate sodium (SENNA WITH DOCUSATE SODIUM) 8.6-50 mg, Take 1 tablet by mouth 2 (two) times a day. For constipation associated with narcotics; hold for loose stools. (Patient not taking: Reported on 5/20/2024), Disp: 60 tablet, Rfl: 0    Past Medical History:  has a past medical history of COVID (11/18/2022), COVID-19 vaccine series completed, GERD (gastroesophageal reflux disease), Hypertension, and Lipid disorder.  Past Surgical History:  has a past surgical history that includes Finger surgery; Bladder surgery; Hysterectomy; and Replacement total knee (Right, 2023).  Social History:   Social History     Tobacco Use    Smoking status: Never    Smokeless tobacco: Never   Vaping Use    Vaping Use: Never used   Substance Use Topics    Alcohol use: Not Currently    Drug use: Never      Family History: family history includes Hypertension in her biological mother.   Allergies: is allergic to penicillins.     Review of Systems: A complete  review of systems is negative except as noted above  The following have been reviewed and updated as appropriate in this visit:          Objective   Vitals: Visit Vitals  /68 (BP Location: Left upper arm, Patient Position: Sitting)   Pulse 86   Temp 36.7 °C (98.1 °F) (Temporal)   Ht 1.562 m (5' 1.5\")   Wt 58.5 kg (129 lb)   SpO2 98%   BMI 23.98 kg/m²     Physical Exam    Heart: Regular rate and rhythm with no murmurs, rubs, or gallops.  Normal S1, S2  Lungs: Clear to auscultation bilaterally with no wheezes, rales, or rhonchi.  Nonlabored respirations.  Skin: At the top of her cleavage there is a small pea-sized palpable mass present " which does not appear to be fixed to underlying structures.  There is some discoloration noted but no erythema or evidence of infection.         Assessment: Subcutaneous mass chest, likely sebaceous cyst, rule out other    Plan: Felicia was treated with an empiric course of oral Levaquin.  She had already started an antibiotic today because she does have a knee in place and was told to do so if she was having any procedures done.  She will apply warm soaks to this area 3 times daily.  She does understand that she should have this area excised and is aware of the indications, risk, complications, and alternatives to doing so, including doing nothing.    Nixon Zhang MD

## 2024-06-04 PROCEDURE — 12031 INTMD RPR S/A/T/EXT 2.5 CM/<: CPT | Mod: XU | Performed by: SURGERY

## 2024-06-04 PROCEDURE — 11404 EXC TR-EXT B9+MARG 3.1-4 CM: CPT | Performed by: SURGERY

## 2024-06-06 ENCOUNTER — TELEPHONE (OUTPATIENT)
Dept: SURGERY | Facility: CLINIC | Age: 89
End: 2024-06-06
Payer: MEDICARE

## 2024-06-06 NOTE — TELEPHONE ENCOUNTER
I did call Felicia on her mobile phone and left a voicemail with her benign pathology below.      nal Diagnosis   Subcutaneous mass chest:               Epidermal inclusion cyst

## 2024-07-01 ENCOUNTER — OFFICE VISIT (OUTPATIENT)
Dept: SURGERY | Facility: CLINIC | Age: 89
End: 2024-07-01
Payer: MEDICARE

## 2024-07-01 VITALS
HEART RATE: 71 BPM | SYSTOLIC BLOOD PRESSURE: 124 MMHG | OXYGEN SATURATION: 98 % | DIASTOLIC BLOOD PRESSURE: 66 MMHG | BODY MASS INDEX: 23.74 KG/M2 | TEMPERATURE: 98 F | HEIGHT: 62 IN | WEIGHT: 129 LBS

## 2024-07-01 DIAGNOSIS — Z09 POSTOP CHECK: Primary | ICD-10-CM

## 2024-07-01 PROCEDURE — 99024 POSTOP FOLLOW-UP VISIT: CPT | Performed by: SURGERY

## 2024-07-01 ASSESSMENT — PAIN SCALES - GENERAL: PAINLEVEL: 0-NO PAIN

## 2024-07-01 NOTE — PROGRESS NOTES
Patient ID: Felicia Puckett                              : 10/22/1933  MRN: [id number]                                            Visit Date: 2024  Encounter Provider: Nixon Zhang  Referring Provider: Leela Dewey MD    Subjective   Chief Complaint: Status post excision subcutaneous mass chest 2024, follow-up exam    Felicia Puckett is a 89 y.o. old female with past medical history as noted below who returns following excision of a subcutaneous mass from her chest done 2024.  She reports no complaints regards to her incisional wound.  She denies any fevers, chills, or wound drainage.     Medications:   Current Outpatient Medications:     acetaminophen (TYLENOL) 500 mg tablet, Take 500 mg by mouth every 6 (six) hours as needed., Disp: , Rfl:     amLODIPine (NORVASC) 5 mg tablet, Take 5 mg by mouth daily. am, Disp: , Rfl:     aspirin 81 mg chewable tablet, Take 1 tablet (81 mg total) by mouth 2 (two) times a day. For 4 weeks to prevent blood clots., Disp: 60 tablet, Rfl: 0    cholecalciferol, vitamin D3, 1,000 unit (25 mcg) tablet, Take 1,000 Units by mouth daily., Disp: , Rfl:     coenzyme Q10 (COQ10) 100 mg capsule, Take 1 capsule (100 mg total) by mouth daily. Hold until cleared by Dr. Vee., Disp: 30 capsule, Rfl: 0    esterified estrogens (MENEST) 0.3 mg tablet, Take 1 tablet (0.3 mg total) by mouth daily. Hold until cleared by Dr. Vee., Disp: 30 tablet, Rfl: 0    hydrochlorothiazide (MICROZIDE) 12.5 mg capsule, Take 12.5 mg by mouth daily. am, Disp: , Rfl:     ibuprofen (MOTRIN) 600 mg tablet, Take 600 mg by mouth every 6 (six) hours as needed. Pt usually takes 1 time a day., Disp: , Rfl:     lansoprazole (PREVACID) 30 mg capsule, Take 30 mg by mouth daily before breakfast. am, Disp: , Rfl:     polyethylene glycol (MIRALAX) 17 gram packet, Take 17 g by mouth daily as needed (constipation) for up to 3 days. For constipation associated with narcotics; hold for loose stools., Disp: ,  "Rfl:     pravastatin (PRAVACHOL) 20 mg tablet, Take 20 mg by mouth daily. pm, Disp: , Rfl:     sennosides-docusate sodium (SENNA WITH DOCUSATE SODIUM) 8.6-50 mg, Take 1 tablet by mouth 2 (two) times a day. For constipation associated with narcotics; hold for loose stools., Disp: 60 tablet, Rfl: 0    Past Medical History:  has a past medical history of COVID (11/18/2022), COVID-19 vaccine series completed, GERD (gastroesophageal reflux disease), Hypertension, and Lipid disorder.    Objective   Vitals: Visit Vitals  /66 (BP Location: Left upper arm, Patient Position: Sitting)   Pulse 71   Temp 36.7 °C (98 °F) (Temporal)   Ht 1.562 m (5' 1.5\")   Wt 58.5 kg (129 lb)   SpO2 98%   BMI 23.98 kg/m²     Physical Exam  On exam her incision is healing well with no erythema or evidence of infection but I did remove her sutures today in the office.    Pathology:  Final Diagnosis   Subcutaneous mass chest:               Epidermal inclusion cyst.       Assessment/plan: Stable postoperative course/exam following excision epidermal inclusion cyst of chest.  Postop restrictions and wound care instruction was again discussed with Felicia today in the office.  We also reviewed her benign pathology.  She does not need to return to but will call with any problems or questions.           Nixon Zhang MD            "

## 2024-07-01 NOTE — LETTER
2024     Leela Dewey MD  [facility]  [place] [id number]    Patient: Felicia Puckett  YOB: 1933  Date of Visit: 2024      Dear Dr. Dewey:    Thank you for referring Felicia Puckett to me for evaluation. Below are my notes for this consultation.    If you have questions, please do not hesitate to call me. I look forward to following your patient along with you.         Sincerely,        Nixon Zhang MD        CC: No Recipients    Nixon Zhang MD  2024 10:34 AM  Sign when Signing Visit    Patient ID: Felicia Puckett                              : 10/22/1933  MRN: [id number]                                            Visit Date: 2024  Encounter Provider: Nixon Zhang  Referring Provider: Leela Dewey MD    Subjective  Chief Complaint: Status post excision subcutaneous mass chest 2024, follow-up exam    Felicia Puckett is a 89 y.o. old female with past medical history as noted below who returns following excision of a subcutaneous mass from her chest done 2024.  She reports no complaints regards to her incisional wound.  She denies any fevers, chills, or wound drainage.     Medications:   Current Outpatient Medications:   •  acetaminophen (TYLENOL) 500 mg tablet, Take 500 mg by mouth every 6 (six) hours as needed., Disp: , Rfl:   •  amLODIPine (NORVASC) 5 mg tablet, Take 5 mg by mouth daily. am, Disp: , Rfl:   •  aspirin 81 mg chewable tablet, Take 1 tablet (81 mg total) by mouth 2 (two) times a day. For 4 weeks to prevent blood clots., Disp: 60 tablet, Rfl: 0  •  cholecalciferol, vitamin D3, 1,000 unit (25 mcg) tablet, Take 1,000 Units by mouth daily., Disp: , Rfl:   •  coenzyme Q10 (COQ10) 100 mg capsule, Take 1 capsule (100 mg total) by mouth daily. Hold until cleared by Dr. Vee., Disp: 30 capsule, Rfl: 0  •  esterified estrogens (MENEST) 0.3 mg tablet, Take 1 tablet (0.3 mg total) by mouth daily. Hold until cleared by Dr. Vee., Disp: 30 tablet, Rfl: 0  •   "hydrochlorothiazide (MICROZIDE) 12.5 mg capsule, Take 12.5 mg by mouth daily. am, Disp: , Rfl:   •  ibuprofen (MOTRIN) 600 mg tablet, Take 600 mg by mouth every 6 (six) hours as needed. Pt usually takes 1 time a day., Disp: , Rfl:   •  lansoprazole (PREVACID) 30 mg capsule, Take 30 mg by mouth daily before breakfast. am, Disp: , Rfl:   •  polyethylene glycol (MIRALAX) 17 gram packet, Take 17 g by mouth daily as needed (constipation) for up to 3 days. For constipation associated with narcotics; hold for loose stools., Disp: , Rfl:   •  pravastatin (PRAVACHOL) 20 mg tablet, Take 20 mg by mouth daily. pm, Disp: , Rfl:   •  sennosides-docusate sodium (SENNA WITH DOCUSATE SODIUM) 8.6-50 mg, Take 1 tablet by mouth 2 (two) times a day. For constipation associated with narcotics; hold for loose stools., Disp: 60 tablet, Rfl: 0    Past Medical History:  has a past medical history of COVID (11/18/2022), COVID-19 vaccine series completed, GERD (gastroesophageal reflux disease), Hypertension, and Lipid disorder.    Objective  Vitals: Visit Vitals  /66 (BP Location: Left upper arm, Patient Position: Sitting)   Pulse 71   Temp 36.7 °C (98 °F) (Temporal)   Ht 1.562 m (5' 1.5\")   Wt 58.5 kg (129 lb)   SpO2 98%   BMI 23.98 kg/m²     Physical Exam  On exam her incision is healing well with no erythema or evidence of infection but I did remove her sutures today in the office.    Pathology:  Final Diagnosis   Subcutaneous mass chest:               Epidermal inclusion cyst.       Assessment/plan: Stable postoperative course/exam following excision epidermal inclusion cyst of chest.  Postop restrictions and wound care instruction was again discussed with Felicia today in the office.  We also reviewed her benign pathology.  She does not need to return to but will call with any problems or questions.           Nixon Zhang MD          "

## 2025-01-10 ENCOUNTER — TRANSCRIBE ORDERS (OUTPATIENT)
Dept: REGISTRATION | Facility: CLINIC | Age: 89
End: 2025-01-10

## 2025-01-10 DIAGNOSIS — N39.8 OTHER SPECIFIED DISORDERS OF URINARY SYSTEM: Primary | ICD-10-CM

## 2025-01-14 ENCOUNTER — HOSPITAL ENCOUNTER (OUTPATIENT)
Dept: RADIOLOGY | Facility: HOSPITAL | Age: 89
Discharge: HOME | End: 2025-01-14
Attending: OBSTETRICS & GYNECOLOGY
Payer: MEDICARE

## 2025-01-14 DIAGNOSIS — N39.8 OTHER SPECIFIED DISORDERS OF URINARY SYSTEM: ICD-10-CM

## 2025-01-14 PROCEDURE — 76770 US EXAM ABDO BACK WALL COMP: CPT

## (undated) DEVICE — SUCTION 18FR FRAZIER DISPOSABLE

## (undated) DEVICE — ***USE 138531*** SUTURE VICRYL 2-0 J266H CP-1 UNDYED

## (undated) DEVICE — Device

## (undated) DEVICE — DRAPE TOWEL 17X23 NON-STERILE

## (undated) DEVICE — KIT CEMENT MIXING CARTRIDGE AND NOZZLE

## (undated) DEVICE — SOLN IRRIG STER WATER 1000ML

## (undated) DEVICE — SLEEVE SCD COMFORT KNEE LENGTH MED

## (undated) DEVICE — APPLICATOR CHLORAPREP 26ML ORANGE TINT

## (undated) DEVICE — SUTURE MONOCRYL 4-0  Y496G PS-2 I8IN

## (undated) DEVICE — MANIFOLD FOUR PORT NEPTUNE

## (undated) DEVICE — DRAPE SPLIT U IMPERVIOUS 89331

## (undated) DEVICE — TUBING SMOKE EVAC PENCIL COATED

## (undated) DEVICE — BLANKET UPPER BODY

## (undated) DEVICE — GLOVE SZ 8.5 LINER PROTEXIS PI BL

## (undated) DEVICE — SET HANDPIECE INTERPULSE

## (undated) DEVICE — GLOVE SZ 7 LINER PROTEXIS PI BL

## (undated) DEVICE — STERILE FLUTED HEADLESS 1/8" PIN 3.5" LONG
Type: IMPLANTABLE DEVICE | Site: KNEE | Status: NON-FUNCTIONAL
Removed: 2022-12-27

## (undated) DEVICE — BANDAGE ESMARK 6X12 LATEX FREE

## (undated) DEVICE — SUTURE VICRYL 1 J699H OS-8 27IN UNDYED

## (undated) DEVICE — SUTURE STRATAFIX PDO 1 OS-8 CUTTING 36CM

## (undated) DEVICE — ADHESIVE SKIN DERMABOND ADVANCED 0.7ML

## (undated) DEVICE — VEST STERILE

## (undated) DEVICE — NEEDLE DISP HYPO 18GX1-1/2IN

## (undated) DEVICE — DRESSING MEPILEX 4X10 BORDER

## (undated) DEVICE — GLOVE SZ 7.5 PROTEXIS PI

## (undated) DEVICE — SOLN IRRIG .9%SOD 3L

## (undated) DEVICE — GLOVE SZ 7 PROTEXIS PI

## (undated) DEVICE — CUFF TOURNIQUET DISP 30 X 4

## (undated) DEVICE — HOOD T7 PLUS

## (undated) DEVICE — COVER LIGHTHANDLE

## (undated) DEVICE — DRAPE-U NON-STERILE

## (undated) DEVICE — BLADE SAGITTAL #127 STABLECUT

## (undated) DEVICE — DRAPE 3/4 REINFORCED

## (undated) DEVICE — GOWN SURGICAL REINFORCED X-LAR

## (undated) DEVICE — PAD GROUND ELECTROSURGICAL W/CORD

## (undated) DEVICE — GLOVE SZ 8.5 PROTEXIS PI

## (undated) DEVICE — SYRINGE ONLY  LUER SLIP TIP 30ML